# Patient Record
Sex: FEMALE | URBAN - METROPOLITAN AREA
[De-identification: names, ages, dates, MRNs, and addresses within clinical notes are randomized per-mention and may not be internally consistent; named-entity substitution may affect disease eponyms.]

---

## 2019-07-24 ENCOUNTER — TRANSCRIBE ORDERS (OUTPATIENT)
Dept: ADMINISTRATIVE | Facility: HOSPITAL | Age: 59
End: 2019-07-24

## 2019-07-24 DIAGNOSIS — Z12.31 ENCOUNTER FOR SCREENING MAMMOGRAM FOR MALIGNANT NEOPLASM OF BREAST: Primary | ICD-10-CM

## 2023-09-08 ENCOUNTER — APPOINTMENT (EMERGENCY)
Dept: CT IMAGING | Facility: HOSPITAL | Age: 63
DRG: 308 | End: 2023-09-08
Payer: COMMERCIAL

## 2023-09-08 ENCOUNTER — HOSPITAL ENCOUNTER (INPATIENT)
Facility: HOSPITAL | Age: 63
LOS: 2 days | Discharge: HOME | DRG: 308 | End: 2023-09-10
Attending: EMERGENCY MEDICINE | Admitting: STUDENT IN AN ORGANIZED HEALTH CARE EDUCATION/TRAINING PROGRAM
Payer: COMMERCIAL

## 2023-09-08 ENCOUNTER — APPOINTMENT (EMERGENCY)
Dept: RADIOLOGY | Facility: HOSPITAL | Age: 63
DRG: 308 | End: 2023-09-08
Payer: COMMERCIAL

## 2023-09-08 DIAGNOSIS — R79.89 ELEVATED D-DIMER: ICD-10-CM

## 2023-09-08 DIAGNOSIS — E66.9 OBESITY: ICD-10-CM

## 2023-09-08 DIAGNOSIS — R00.0 TACHYCARDIA: ICD-10-CM

## 2023-09-08 DIAGNOSIS — I10 HTN (HYPERTENSION): ICD-10-CM

## 2023-09-08 DIAGNOSIS — J81.1 PULMONARY EDEMA: ICD-10-CM

## 2023-09-08 DIAGNOSIS — R06.02 SHORTNESS OF BREATH: ICD-10-CM

## 2023-09-08 DIAGNOSIS — I48.92 NEW ONSET ATRIAL FLUTTER (HCC): Primary | ICD-10-CM

## 2023-09-08 DIAGNOSIS — I48.92 ATRIAL FLUTTER WITH RAPID VENTRICULAR RESPONSE (HCC): ICD-10-CM

## 2023-09-08 DIAGNOSIS — R91.1 INCIDENTAL LUNG NODULE, GREATER THAN OR EQUAL TO 8MM: ICD-10-CM

## 2023-09-08 DIAGNOSIS — J90 PLEURAL EFFUSION: ICD-10-CM

## 2023-09-08 PROBLEM — N17.9 AKI (ACUTE KIDNEY INJURY) (HCC): Status: ACTIVE | Noted: 2023-09-08

## 2023-09-08 PROBLEM — R91.8 PULMONARY NODULES: Status: ACTIVE | Noted: 2023-09-08

## 2023-09-08 PROBLEM — E87.70 FLUID OVERLOAD: Status: ACTIVE | Noted: 2023-09-08

## 2023-09-08 LAB
2HR DELTA HS TROPONIN: 0 NG/L
4HR DELTA HS TROPONIN: -4 NG/L
ALBUMIN SERPL BCP-MCNC: 3.5 G/DL (ref 3.5–5)
ALP SERPL-CCNC: 103 U/L (ref 34–104)
ALT SERPL W P-5'-P-CCNC: 50 U/L (ref 7–52)
ANION GAP SERPL CALCULATED.3IONS-SCNC: 5 MMOL/L
AST SERPL W P-5'-P-CCNC: 37 U/L (ref 13–39)
ATRIAL RATE: 162 BPM
ATRIAL RATE: 315 BPM
ATRIAL RATE: 318 BPM
BASOPHILS # BLD MANUAL: 0 THOUSAND/UL (ref 0–0.1)
BASOPHILS NFR MAR MANUAL: 0 % (ref 0–1)
BILIRUB SERPL-MCNC: 0.86 MG/DL (ref 0.2–1)
BNP SERPL-MCNC: 465 PG/ML (ref 0–100)
BUN SERPL-MCNC: 15 MG/DL (ref 5–25)
CA-I BLD-SCNC: 1.11 MMOL/L (ref 1.12–1.32)
CALCIUM SERPL-MCNC: 8.8 MG/DL (ref 8.4–10.2)
CARDIAC TROPONIN I PNL SERPL HS: 16 NG/L
CARDIAC TROPONIN I PNL SERPL HS: 20 NG/L
CARDIAC TROPONIN I PNL SERPL HS: 20 NG/L
CHLORIDE SERPL-SCNC: 108 MMOL/L (ref 96–108)
CO2 SERPL-SCNC: 27 MMOL/L (ref 21–32)
CREAT SERPL-MCNC: 1.5 MG/DL (ref 0.6–1.3)
D DIMER PPP FEU-MCNC: 2.66 UG/ML FEU
EOSINOPHIL # BLD MANUAL: 0.13 THOUSAND/UL (ref 0–0.4)
EOSINOPHIL NFR BLD MANUAL: 2 % (ref 0–6)
ERYTHROCYTE [DISTWIDTH] IN BLOOD BY AUTOMATED COUNT: 13.3 % (ref 11.6–15.1)
FLUAV RNA RESP QL NAA+PROBE: NEGATIVE
FLUBV RNA RESP QL NAA+PROBE: NEGATIVE
GFR SERPL CREATININE-BSD FRML MDRD: 37 ML/MIN/1.73SQ M
GLUCOSE SERPL-MCNC: 96 MG/DL (ref 65–140)
HCT VFR BLD AUTO: 39.4 % (ref 34.8–46.1)
HGB BLD-MCNC: 12 G/DL (ref 11.5–15.4)
LYMPHOCYTES # BLD AUTO: 2.62 THOUSAND/UL (ref 0.6–4.47)
LYMPHOCYTES # BLD AUTO: 36 % (ref 14–44)
MAGNESIUM SERPL-MCNC: 2 MG/DL (ref 1.9–2.7)
MCH RBC QN AUTO: 28.5 PG (ref 26.8–34.3)
MCHC RBC AUTO-ENTMCNC: 30.5 G/DL (ref 31.4–37.4)
MCV RBC AUTO: 94 FL (ref 82–98)
MONOCYTES # BLD AUTO: 0.33 THOUSAND/UL (ref 0–1.22)
MONOCYTES NFR BLD: 5 % (ref 4–12)
NEUTROPHILS # BLD MANUAL: 3.47 THOUSAND/UL (ref 1.85–7.62)
NEUTS SEG NFR BLD AUTO: 53 % (ref 43–75)
P AXIS: -70 DEGREES
P AXIS: 71 DEGREES
PHOSPHATE SERPL-MCNC: 4 MG/DL (ref 2.3–4.1)
PLATELET # BLD AUTO: 213 THOUSANDS/UL (ref 149–390)
PLATELET BLD QL SMEAR: ADEQUATE
PMV BLD AUTO: 11.2 FL (ref 8.9–12.7)
POLYCHROMASIA BLD QL SMEAR: PRESENT
POTASSIUM SERPL-SCNC: 4 MMOL/L (ref 3.5–5.3)
PR INTERVAL: 104 MS
PROT SERPL-MCNC: 7 G/DL (ref 6.4–8.4)
QRS AXIS: 72 DEGREES
QRS AXIS: 78 DEGREES
QRS AXIS: 79 DEGREES
QRSD INTERVAL: 158 MS
QRSD INTERVAL: 80 MS
QRSD INTERVAL: 96 MS
QT INTERVAL: 168 MS
QT INTERVAL: 290 MS
QT INTERVAL: 350 MS
QTC INTERVAL: 222 MS
QTC INTERVAL: 464 MS
QTC INTERVAL: 476 MS
RBC # BLD AUTO: 4.21 MILLION/UL (ref 3.81–5.12)
RSV RNA RESP QL NAA+PROBE: NEGATIVE
SARS-COV-2 RNA RESP QL NAA+PROBE: NEGATIVE
SODIUM SERPL-SCNC: 140 MMOL/L (ref 135–147)
T WAVE AXIS: -52 DEGREES
T WAVE AXIS: 64 DEGREES
T WAVE AXIS: 95 DEGREES
TSH SERPL DL<=0.05 MIU/L-ACNC: 1.55 UIU/ML (ref 0.45–4.5)
VARIANT LYMPHS # BLD AUTO: 4 %
VENTRICULAR RATE: 105 BPM
VENTRICULAR RATE: 106 BPM
VENTRICULAR RATE: 162 BPM
WBC # BLD AUTO: 6.55 THOUSAND/UL (ref 4.31–10.16)

## 2023-09-08 PROCEDURE — G1004 CDSM NDSC: HCPCS

## 2023-09-08 PROCEDURE — 93010 ELECTROCARDIOGRAM REPORT: CPT | Performed by: INTERNAL MEDICINE

## 2023-09-08 PROCEDURE — 80053 COMPREHEN METABOLIC PANEL: CPT

## 2023-09-08 PROCEDURE — 99285 EMERGENCY DEPT VISIT HI MDM: CPT

## 2023-09-08 PROCEDURE — 84100 ASSAY OF PHOSPHORUS: CPT

## 2023-09-08 PROCEDURE — 74177 CT ABD & PELVIS W/CONTRAST: CPT

## 2023-09-08 PROCEDURE — 93005 ELECTROCARDIOGRAM TRACING: CPT

## 2023-09-08 PROCEDURE — 83735 ASSAY OF MAGNESIUM: CPT

## 2023-09-08 PROCEDURE — 96374 THER/PROPH/DIAG INJ IV PUSH: CPT

## 2023-09-08 PROCEDURE — 71275 CT ANGIOGRAPHY CHEST: CPT

## 2023-09-08 PROCEDURE — 85379 FIBRIN DEGRADATION QUANT: CPT

## 2023-09-08 PROCEDURE — 99223 1ST HOSP IP/OBS HIGH 75: CPT | Performed by: INTERNAL MEDICINE

## 2023-09-08 PROCEDURE — 96375 TX/PRO/DX INJ NEW DRUG ADDON: CPT

## 2023-09-08 PROCEDURE — 82330 ASSAY OF CALCIUM: CPT

## 2023-09-08 PROCEDURE — 83880 ASSAY OF NATRIURETIC PEPTIDE: CPT | Performed by: EMERGENCY MEDICINE

## 2023-09-08 PROCEDURE — 84443 ASSAY THYROID STIM HORMONE: CPT | Performed by: EMERGENCY MEDICINE

## 2023-09-08 PROCEDURE — 85027 COMPLETE CBC AUTOMATED: CPT

## 2023-09-08 PROCEDURE — 0241U HB NFCT DS VIR RESP RNA 4 TRGT: CPT

## 2023-09-08 PROCEDURE — 36415 COLL VENOUS BLD VENIPUNCTURE: CPT

## 2023-09-08 PROCEDURE — 84484 ASSAY OF TROPONIN QUANT: CPT

## 2023-09-08 PROCEDURE — 85007 BL SMEAR W/DIFF WBC COUNT: CPT

## 2023-09-08 PROCEDURE — 71045 X-RAY EXAM CHEST 1 VIEW: CPT

## 2023-09-08 PROCEDURE — 99222 1ST HOSP IP/OBS MODERATE 55: CPT | Performed by: STUDENT IN AN ORGANIZED HEALTH CARE EDUCATION/TRAINING PROGRAM

## 2023-09-08 RX ORDER — HEPARIN SODIUM 5000 [USP'U]/ML
5000 INJECTION, SOLUTION INTRAVENOUS; SUBCUTANEOUS EVERY 8 HOURS SCHEDULED
Status: DISCONTINUED | OUTPATIENT
Start: 2023-09-08 | End: 2023-09-10

## 2023-09-08 RX ORDER — LOSARTAN POTASSIUM 25 MG/1
25 TABLET ORAL DAILY
COMMUNITY
End: 2023-09-10

## 2023-09-08 RX ORDER — DILTIAZEM HYDROCHLORIDE 5 MG/ML
0.25 INJECTION INTRAVENOUS ONCE
Status: COMPLETED | OUTPATIENT
Start: 2023-09-08 | End: 2023-09-08

## 2023-09-08 RX ORDER — DILTIAZEM HYDROCHLORIDE 120 MG/1
120 CAPSULE, COATED, EXTENDED RELEASE ORAL DAILY
Status: DISCONTINUED | OUTPATIENT
Start: 2023-09-08 | End: 2023-09-10 | Stop reason: HOSPADM

## 2023-09-08 RX ORDER — DILTIAZEM HYDROCHLORIDE 5 MG/ML
15 INJECTION INTRAVENOUS ONCE
Status: DISCONTINUED | OUTPATIENT
Start: 2023-09-08 | End: 2023-09-08

## 2023-09-08 RX ORDER — DILTIAZEM HYDROCHLORIDE 5 MG/ML
10 INJECTION INTRAVENOUS ONCE
Status: COMPLETED | OUTPATIENT
Start: 2023-09-08 | End: 2023-09-08

## 2023-09-08 RX ORDER — METOPROLOL TARTRATE 5 MG/5ML
10 INJECTION INTRAVENOUS ONCE
Status: COMPLETED | OUTPATIENT
Start: 2023-09-08 | End: 2023-09-08

## 2023-09-08 RX ORDER — DILTIAZEM HYDROCHLORIDE 120 MG/1
120 CAPSULE, COATED, EXTENDED RELEASE ORAL DAILY
Status: DISCONTINUED | OUTPATIENT
Start: 2023-09-08 | End: 2023-09-08

## 2023-09-08 RX ORDER — FOLIC ACID 1 MG/1
1 TABLET ORAL DAILY
COMMUNITY

## 2023-09-08 RX ORDER — FUROSEMIDE 10 MG/ML
20 INJECTION INTRAMUSCULAR; INTRAVENOUS DAILY
Status: COMPLETED | OUTPATIENT
Start: 2023-09-08 | End: 2023-09-09

## 2023-09-08 RX ORDER — FUROSEMIDE 10 MG/ML
40 INJECTION INTRAMUSCULAR; INTRAVENOUS ONCE
Status: COMPLETED | OUTPATIENT
Start: 2023-09-08 | End: 2023-09-08

## 2023-09-08 RX ORDER — SODIUM CHLORIDE, SODIUM GLUCONATE, SODIUM ACETATE, POTASSIUM CHLORIDE, MAGNESIUM CHLORIDE, SODIUM PHOSPHATE, DIBASIC, AND POTASSIUM PHOSPHATE .53; .5; .37; .037; .03; .012; .00082 G/100ML; G/100ML; G/100ML; G/100ML; G/100ML; G/100ML; G/100ML
500 INJECTION, SOLUTION INTRAVENOUS ONCE
Status: DISCONTINUED | OUTPATIENT
Start: 2023-09-08 | End: 2023-09-08

## 2023-09-08 RX ADMIN — HEPARIN SODIUM 5000 UNITS: 5000 INJECTION INTRAVENOUS; SUBCUTANEOUS at 22:15

## 2023-09-08 RX ADMIN — IOHEXOL 100 ML: 350 INJECTION, SOLUTION INTRAVENOUS at 09:22

## 2023-09-08 RX ADMIN — DILTIAZEM HYDROCHLORIDE 60 MG: 30 TABLET ORAL at 10:40

## 2023-09-08 RX ADMIN — DILTIAZEM HYDROCHLORIDE 27.5 MG: 5 INJECTION INTRAVENOUS at 10:59

## 2023-09-08 RX ADMIN — FUROSEMIDE 20 MG: 10 INJECTION, SOLUTION INTRAMUSCULAR; INTRAVENOUS at 16:19

## 2023-09-08 RX ADMIN — DILTIAZEM HYDROCHLORIDE 27.5 MG: 5 INJECTION INTRAVENOUS at 09:54

## 2023-09-08 RX ADMIN — DILTIAZEM HYDROCHLORIDE 120 MG: 120 CAPSULE, COATED, EXTENDED RELEASE ORAL at 16:18

## 2023-09-08 RX ADMIN — FUROSEMIDE 40 MG: 10 INJECTION, SOLUTION INTRAMUSCULAR; INTRAVENOUS at 10:37

## 2023-09-08 RX ADMIN — ASPIRIN 81 MG: 81 TABLET, COATED ORAL at 16:18

## 2023-09-08 RX ADMIN — DILTIAZEM HYDROCHLORIDE 2.5 MG/HR: 5 INJECTION INTRAVENOUS at 11:03

## 2023-09-08 RX ADMIN — METOROPROLOL TARTRATE 10 MG: 5 INJECTION, SOLUTION INTRAVENOUS at 08:30

## 2023-09-08 RX ADMIN — HEPARIN SODIUM 5000 UNITS: 5000 INJECTION INTRAVENOUS; SUBCUTANEOUS at 16:19

## 2023-09-08 RX ADMIN — DILTIAZEM HYDROCHLORIDE 10 MG: 5 INJECTION INTRAVENOUS at 14:58

## 2023-09-08 NOTE — ASSESSMENT & PLAN NOTE
· Presented to the ED with heart palpitations that have been ongoing for the last several months. Worsened in the last week. · In the ER found to have a narrow complex tachycardia to the 160s.   Received metoprolol 5 mg IV x2 without any effect  · Repeat chest x-ray showed atrial fibrillation with variable block, heart rate was in the low to mid 100  · CTA chest showed no evidence of PE  · COVID/RVP panel negative  · Troponin 20>20 flat, no need to repeat   · She was started on diltiazem drip with bolus currently heart rates are better controlled (80s to 90s)  · She was also given a dose of oral diltiazem 60 mg  · Plan is to titrate off the drip in the next hour  · Cardiology consulted  · Discussed anticoagulation but with her low chadvasc 1 (female) will defer

## 2023-09-08 NOTE — H&P
[address]  H&P  Name: Mary Ann Muñoz 63 y.o. female I MRN: [id number]  Unit/Bed#: ED 15 I Date of Admission: 9/8/2023   Date of Service: 9/8/2023 I Hospital Day: 0      Assessment/Plan   * Atrial flutter [facility])  Assessment & Plan  · Presented to the ED with heart palpitations that have been ongoing for the last several months. Worsened in the last week. · In the ER found to have a narrow complex tachycardia to the 160s. Received metoprolol 5 mg IV x2 without any effect  · Repeat chest x-ray showed atrial fibrillation with variable block, heart rate was in the low to mid 100  · CTA chest showed no evidence of PE  · COVID/RVP panel negative  · Troponin 20>20 flat, no need to repeat   · She was started on diltiazem drip with bolus currently heart rates are better controlled (80s to 90s)  · She was also given a dose of oral diltiazem 60 mg  · Plan is to titrate off the drip in the next hour  · Cardiology consulted  · Discussed anticoagulation but with her low chadvasc 1 (female) will defer    Fluid overload  Assessment & Plan  · Chest x-ray with possible vascular congestion  · BNP elevated  · CTAP showed mild pleural effusions  · Received Lasix 40 mg IV  · No history of heart failure as per the patient she received an echocardiogram in [facility] last week, however unfortunately not available in our records. Will try to obtain from PCP.  However office is not answering if unsuccessful we will repeat TTE  · Cardiology consulted    Pulmonary nodules  Assessment & Plan  · Incidentally seen on CT, largest is 8 mm  · Discussed with patient and   · Repeat CT scan recommended in 3 to 6 months    INGRID (acute kidney injury) ([address])  Assessment & Plan  · Creatinine 1.5  · Unfortunately we do not have a baseline, patient is unaware  · She denies any history of kidney disease  · Avoid nephrotoxic agents  · We will avoid fluids for now due to concern for fluid overload  · Repeat BMP next AM VTE Pharmacologic Prophylaxis:   Moderate Risk (Score 3-4) - Pharmacological DVT Prophylaxis Ordered: enoxaparin (Lovenox). Code Status: Level 1 - Full Code Full per patient   Discussion with family: Updated  () at bedside. Anticipated Length of Stay: Patient will be admitted on an inpatient basis with an anticipated length of stay of greater than 2 midnights secondary to AF rvr. Total Time Spent on Date of Encounter in care of patient: 55 minutes This time was spent on one or more of the following: performing physical exam; counseling and coordination of care; obtaining or reviewing history; documenting in the medical record; reviewing/ordering tests, medications or procedures; communicating with other healthcare professionals and discussing with patient's family/caregivers. Chief Complaint: SOB, palpitations    History of Present Illness:  Valla Riedel is a 63 y.o. female with a no reported PMH who presents with shortness of breath and palpitations. Reported this has been an ongoing issue for the past several months. She was recently in [facility] and developed the symptoms they are however did not seek medical attention. Symptoms resolved spontaneously. 4 days ago they returned and since then she has been having progressive dyspnea and palpitations. She denied chest pain. She is usually very active. She is a retired teacher. She denied any smoking, etoh or illicit drug history. She denies any medical history and has not been on any medications other than folic acid. She was also recently on a round of antibiotics after tooth extraction. Review of Systems:  Review of Systems   Constitutional: Positive for activity change and fatigue. Negative for chills and fever. HENT: Negative for congestion, ear discharge and ear pain. Eyes: Negative for pain and discharge. Respiratory: Positive for shortness of breath. Negative for choking and wheezing.     Cardiovascular: Positive for palpitations. Negative for chest pain and leg swelling. Gastrointestinal: Negative for abdominal distention, abdominal pain and anal bleeding. Genitourinary: Negative for difficulty urinating, dysuria and flank pain. Neurological: Negative for syncope and headaches. Psychiatric/Behavioral: Negative for agitation, behavioral problems and confusion. Past Medical and Surgical History:   Past Medical History:   Diagnosis Date   • Hypertension        Past Surgical History:   Procedure Laterality Date   •  SECTION         Meds/Allergies:  Prior to Admission medications    Not on File     I have reviewed home medications with patient personally. Allergies: No Known Allergies    Social History:  Marital Status: /Civil Union   Occupation: retired    Patient Pre-hospital Living Situation: Home  Patient Pre-hospital Level of Mobility: walks  Patient Pre-hospital Diet Restrictions: none  Substance Use History:   Social History     Substance and Sexual Activity   Alcohol Use Never     Social History     Tobacco Use   Smoking Status Never   Smokeless Tobacco Never     Social History     Substance and Sexual Activity   Drug Use Never       Family History:  History reviewed. No pertinent family history.     Physical Exam:     Vitals:   Blood Pressure: 130/80 (23 1143)  Pulse: 80 (23 1143)  Temperature: 98 °F (36.7 °C) (23 0825)  Temp Source: Temporal (23 08)  Respirations: 18 (23 1143)  Weight - Scale: 112 kg (248 lb) (23 0933)  SpO2: 99 % (23 1143)    Physical Exam   General: NAD   HEENT: Normal conjunctiva, EOMI  Neck: No palpable lymphadenopathy     Heart: Regular rate, irregular rhythm  Lungs: Clear lungs, nonlabored respirations, mild bibasilar rhonchi  Abdomen: Nontender, nondistended  Extremities: No pitting edema in bilateral lower extremities noted   Neuro: Alert and oriented x3, no focal deficits  Psych: Cooperative/calm      Additional Data:     Lab Results:  Results from last 7 days   Lab Units 09/08/23  0828   WBC Thousand/uL 6.55   HEMOGLOBIN g/dL 12.0   HEMATOCRIT % 39.4   PLATELETS Thousands/uL 213   LYMPHO PCT % 36   MONO PCT % 5   EOS PCT % 2     Results from last 7 days   Lab Units 09/08/23  0828   SODIUM mmol/L 140   POTASSIUM mmol/L 4.0   CHLORIDE mmol/L 108   CO2 mmol/L 27   BUN mg/dL 15   CREATININE mg/dL 1.50*   ANION GAP mmol/L 5   CALCIUM mg/dL 8.8   ALBUMIN g/dL 3.5   TOTAL BILIRUBIN mg/dL 0.86   ALK PHOS U/L 103   ALT U/L 50   AST U/L 37   GLUCOSE RANDOM mg/dL 96                       Lines/Drains:  Invasive Devices     Peripheral Intravenous Line  Duration           Peripheral IV 09/08/23 Left Antecubital <1 day    Peripheral IV 09/08/23 Right Antecubital <1 day                    Imaging: Reviewed radiology reports from this admission including: chest CT scan  CT pe study w abdomen pelvis w contrast   Final Result by Sandeep Junior MD (09/08 4042)      1. No pulmonary embolism. 2.  Mild pulmonary edema and small bilateral pleural effusions. 3.  No acute findings in the abdomen or pelvis. 4.  Dual small groundglass nodules in the right upper lobe, larger being 8 mm. Per Fleischner Society guidelines on the management of incidentally detected pulmonary nodules, recommend follow-up chest CT in 3-6 months. The study was marked in EPIC for significant notification. Workstation performed: [id number]         XR chest 1 view portable   Final Result by Enrike Rosenthal MD (09/08 8830)      Possible pulmonary vascular congestion. Correlation with symptoms and BNP is advised. Workstation performed: [id number]             EKG and Other Studies Reviewed on Admission:   · EKG: Atrial flutter. HR 90.    ** Please Note: This note has been constructed using a voice recognition system.  **

## 2023-09-08 NOTE — ED ATTENDING ATTESTATION
9/8/2023  IGina DO, saw and evaluated the patient. I have discussed the patient with the resident/non-physician practitioner and agree with the resident's/non-physician practitioner's findings, Plan of Care, and MDM as documented in the resident's/non-physician practitioner's note, except where noted. All available labs and Radiology studies were reviewed. I was present for key portions of any procedure(s) performed by the resident/non-physician practitioner and I was immediately available to provide assistance. At this point I agree with the current assessment done in the Emergency Department. I have conducted an independent evaluation of this patient a history and physical is as follows:    Patient is a 46-year-old female with past medical history of hypertension, presents to the emergency department for dyspnea, palpitations and chest heaviness that started on Monday. Patient recently flew to and from [facility] about 1 month ago. Starting earlier this week, she started to feel short of breath, worse with exertion and laying flat. She also feels her heart racing and when she lays flat she reports chest heaviness and pressure. Denies ever having this type of symptoms before. She denies any cough, hemoptysis, fevers or chills, headache, vertigo. She does report intermittent lightheadedness that is brief. Denies syncope, URI symptoms, abdominal pain, vomiting, diarrhea, constipation, blood per rectum or melena, urinary symptoms, leg pain or swelling, extremity weakness or paresthesia or other focal neurologic deficits. She does report intermittent nausea. Denies any hormone use or any history of VTE or known cardiac disease. On exam, patient obese, laying comfortably in no apparent distress. HEENT exam unremarkable. Lungs have diminished breath sounds in bilateral bases with bibasilar rales. Decreased air movement throughout.   Heart regular rhythm but significantly tachycardic with heart rate in the 150s. Abdomen soft, nontender, nondistended. No lower extremity pitting edema or calf swelling/tenderness. No focal motor or sensory deficits. Assessment and plan: 28-year-old female presents to the ED with 5 days of dyspnea, palpitations, chest heaviness. Patient presents significantly tachycardic with heart rate in the 150s. Rhythm appears to be sinus tachycardia. Patient given Lopressor 5 mg x 2 doses with minimal change in heart rate. Differential includes but is not limited to tachydysrhythmia, SVT, A-fib, acute PE, heart failure due to either PE or tachydysrhythmia. Will work-up with cardiac labs, D-dimer, TSH. We will have low threshold to obtain CTA chest to definitively rule out PE. Will consider a dose of Cardizem for rate control given no significant relief from the Lopressor. If no change after Cardizem, will consider cardiology consultation. Chest x-ray reviewed and patient does have mild pulmonary vascular congestion so we will hold off on IV fluids at this time as there is concern for heart failure. ED Course  ED Course as of 09/09/23 1246   Fri Sep 08, 2023   1015 Patient given Cardizem and her heart rate immediately improved to 107 bpm and she symptomatically improved in regards to her dyspnea and palpitations. Within 5 to 10 minutes of the Cardizem bolus, heart rate returned to 150 bpm.  An EKG was obtained after initial rate reduction and confirms atrial flutter with RVR. Will give second dose of Cardizem and start Cardizem drip. Patient updated about CT findings and plan to admit due to new onset atrial flutter with RVR as well as new onset heart failure. 1018 Patient's O2 noted to be between 89% and 92% which is new since being in the ED so will place on 2 L nasal cannula. 1101 Patient admitted to Step-down level 2 bed due to cardizem gtt under SLIM service.           Critical Care Time  Procedures

## 2023-09-08 NOTE — ED PROVIDER NOTES
History  Chief Complaint   Patient presents with   • Palpitations     Pt c/o feeling like her heart in racing since Monday, pt states it feels worse while laying down      63year old with PMHx of HTN who presents with heart palpitations that started about 4 weeks ago while the patient was [facility] in [facility]. She states she was doing a lot of walking and physical activity while in [facility] and noticed that she would get more SOB and feel heart palpitations. She denies feeling ill otherwise while in [facility] or now. She denies N/V/D. She reports feeling "hot" intermittently throughout the last week. She states she felt chest "pressure" yesterday but has subsided since and denies current chest pain or pressure. She reports increased SOB with lying flat. She denies numbness/tingling of extremities, pain in her BLE calves, syncope, lightheadedness, headache, or changes to her vision or hearing. She states that she recently began seeing Cardiology for the first time last week due to PCP concerns for HTN. The patient states she is actively monitoring her BP at home but is not currently taking medication for hypertension. She use of alcohol, tobacco, marijuana, or illicit drug use. Upon initial presentation, patient is hypertensive with SBP in the 90-100s and appears to have a narrow complex tachycardia on the monitor with HR in the 160s, satting >94% on room air, in no acute respiratory distress. None       Past Medical History:   Diagnosis Date   • Hypertension        Past Surgical History:   Procedure Laterality Date   •  SECTION         History reviewed. No pertinent family history. I have reviewed and agree with the history as documented.     E-Cigarette/Vaping     E-Cigarette/Vaping Substances     Social History     Tobacco Use   • Smoking status: Never   • Smokeless tobacco: Never   Substance Use Topics   • Alcohol use: Never   • Drug use: Never        Review of Systems   Constitutional: Positive for fatigue. HENT: Negative. Eyes: Negative. Respiratory: Positive for shortness of breath. Negative for cough, choking, chest tightness, wheezing and stridor. Cardiovascular: Positive for palpitations (since this past Monday ). Negative for chest pain and leg swelling. Gastrointestinal: Negative. Reports infrequent epigastric "discomfort"    Endocrine: Negative. Musculoskeletal: Negative. Skin: Negative. Allergic/Immunologic: Negative. Neurological: Negative. Psychiatric/Behavioral: Negative. All other systems reviewed and are negative. Physical Exam  ED Triage Vitals   Temperature Pulse Respirations Blood Pressure SpO2   09/08/23 0825 09/08/23 0821 09/08/23 0821 09/08/23 0821 09/08/23 0821   98 °F (36.7 °C) (!) 160 19 145/94 97 %      Temp Source Heart Rate Source Patient Position - Orthostatic VS BP Location FiO2 (%)   09/08/23 0825 09/08/23 0821 09/08/23 0821 09/08/23 0821 --   Temporal Monitor Sitting Right arm       Pain Score       --                  Orthostatic Vital Signs  Vitals:    09/08/23 0930 09/08/23 1015 09/08/23 1040 09/08/23 1110   BP: 128/82 120/68 133/81 135/82   Pulse: (!) 152 83  (!) 106   Patient Position - Orthostatic VS:           Physical Exam  Vitals reviewed. Constitutional:       General: She is not in acute distress. Appearance: She is not ill-appearing. HENT:      Head: Normocephalic and atraumatic. Mouth/Throat:      Mouth: Mucous membranes are moist.   Eyes:      General: No scleral icterus. Extraocular Movements: Extraocular movements intact. Pupils: Pupils are equal, round, and reactive to light. Cardiovascular:      Rate and Rhythm: Regular rhythm. Tachycardia present. Heart sounds: No murmur heard. No friction rub. No gallop. Comments: HR 160s on telemetry. Palpable, rapid pulses: +2/+2  Pulmonary:      Effort: Pulmonary effort is normal. No respiratory distress. Breath sounds:  No wheezing, rhonchi or rales. Comments: Diminished in b/l lung bases  Abdominal:      General: Bowel sounds are normal. There is no distension. Palpations: Abdomen is soft. Tenderness: There is no abdominal tenderness. There is no rebound. Musculoskeletal:      Cervical back: Normal range of motion and neck supple. Right lower leg: No edema. Left lower leg: No edema. Skin:     General: Skin is warm and dry. Capillary Refill: Capillary refill takes less than 2 seconds. Neurological:      General: No focal deficit present. Mental Status: She is alert and oriented to person, place, and time. Psychiatric:         Mood and Affect: Mood normal.         Behavior: Behavior normal.         ED Medications  Medications   diltiazem (CARDIZEM) 125 mg in sodium chloride 0.9 % 125 mL infusion (2.5 mg/hr Intravenous New Bag 9/8/23 1103)   metoprolol (LOPRESSOR) injection 10 mg (10 mg Intravenous Given 9/8/23 0830)   iohexol (OMNIPAQUE) 350 MG/ML injection (SINGLE-DOSE) 100 mL (100 mL Intravenous Given 9/8/23 0922)   diltiazem (CARDIZEM) injection 27.5 mg (27.5 mg Intravenous Given 9/8/23 0954)   diltiazem (CARDIZEM) injection 27.5 mg (27.5 mg Intravenous Given 9/8/23 1059)   furosemide (LASIX) injection 40 mg (40 mg Intravenous Given 9/8/23 1037)   diltiazem (CARDIZEM) tablet 60 mg (60 mg Oral Given 9/8/23 1040)       Diagnostic Studies  Results Reviewed     Procedure Component Value Units Date/Time    HS Troponin I 2hr [[id number]] Collected: 09/08/23 1107    Lab Status:  In process Specimen: Blood from Arm, Right Updated: 09/08/23 1111    TSH, 3rd generation with Free T4 reflex [[id number]]  (Normal) Collected: 09/08/23 0828    Lab Status: Final result Specimen: Blood from Arm, Left Updated: 09/08/23 1057     TSH 3RD GENERATON 1.550 uIU/mL     HS Troponin I 4hr [[id number]]     Lab Status: No result Specimen: Blood     FLU/RSV/COVID - if FLU/RSV clinically relevant [[id number]]  (Normal) Collected: 09/08/23 0932    Lab Status: Final result Specimen: Nares from Nose Updated: 09/08/23 1015     SARS-CoV-2 Negative     INFLUENZA A PCR Negative     INFLUENZA B PCR Negative     RSV PCR Negative    Narrative:      FOR PEDIATRIC PATIENTS - copy/paste COVID Guidelines URL to browser: [email or web address]    SARS-CoV-2 assay is a Nucleic Acid Amplification assay intended for the  qualitative detection of nucleic acid from SARS-CoV-2 in nasopharyngeal  swabs. Results are for the presumptive identification of SARS-CoV-2 RNA. Positive results are indicative of infection with SARS-CoV-2, the virus  causing COVID-19, but do not rule out bacterial infection or co-infection  with other viruses. Laboratories within the [facility] and its  territories are required to report all positive results to the appropriate  public health authorities. Negative results do not preclude SARS-CoV-2  infection and should not be used as the sole basis for treatment or other  patient management decisions. Negative results must be combined with  clinical observations, patient history, and epidemiological information. This test has not been FDA cleared or approved. This test has been authorized by FDA under an Emergency Use Authorization  (EUA). This test is only authorized for the duration of time the  declaration that circumstances exist justifying the authorization of the  emergency use of an in vitro diagnostic tests for detection of SARS-CoV-2  virus and/or diagnosis of COVID-19 infection under section 564(b)(1) of  the Act, 21 U. S.C. 492WTK-6(P)(0), unless the authorization is terminated  or revoked sooner. The test has been validated but independent review by FDA  and CLIA is pending. Test performed using [employer]: This RT-PCR assay targets N2,  a region unique to SARS-CoV-2.  A conserved region in the E-gene was chosen  for pan-Sarbecovirus detection which includes SARS-CoV-2. According to CMS-2020-01-R, this platform meets the definition of high-throughput technology. RBC Morphology Reflex Test [[id number]] Collected: 09/08/23 0828    Lab Status: Final result Specimen: Blood from Arm, Left Updated: 09/08/23 1001    Calcium, ionized [[id number]]  (Abnormal) Collected: 09/08/23 0943    Lab Status: Final result Specimen: Blood from Arm, Left Updated: 09/08/23 0950     Calcium, Ionized 1.11 mmol/L     B-Type Natriuretic Peptide(BNP) [[id number]]  (Abnormal) Collected: 09/08/23 0828    Lab Status: Final result Specimen: Blood from Arm, Left Updated: 09/08/23 0949      pg/mL     Magnesium [[id number]]  (Normal) Collected: 09/08/23 0828    Lab Status: Final result Specimen: Blood from Arm, Left Updated: 09/08/23 0939     Magnesium 2.0 mg/dL     Phosphorus [[id number]]  (Normal) Collected: 09/08/23 0828    Lab Status: Final result Specimen: Blood from Arm, Left Updated: 09/08/23 0939     Phosphorus 4.0 mg/dL     CBC and differential [[id number]]  (Abnormal) Collected: 09/08/23 0828    Lab Status: Final result Specimen: Blood from Arm, Left Updated: 09/08/23 0929     WBC 6.55 Thousand/uL      RBC 4.21 Million/uL      Hemoglobin 12.0 g/dL      Hematocrit 39.4 %      MCV 94 fL      MCH 28.5 pg      MCHC 30.5 g/dL      RDW 13.3 %      MPV 11.2 fL      Platelets 395 Thousands/uL     Narrative: This is an appended report. These results have been appended to a previously verified report.     Manual Differential(PHLEBS Do Not Order) [[id number]]  (Abnormal) Collected: 09/08/23 0828    Lab Status: Final result Specimen: Blood from Arm, Left Updated: 09/08/23 0929     Segmented % 53 %      Lymphocytes % 36 %      Monocytes % 5 %      Eosinophils, % 2 %      Basophils % 0 %      Atypical Lymphocytes % 4 %      Absolute Neutrophils 3.47 Thousand/uL      Lymphocytes Absolute 2.62 Thousand/uL      Monocytes Absolute 0.33 Thousand/uL      Eosinophils Absolute 0.13 Thousand/uL Basophils Absolute 0.00 Thousand/uL      Total Counted --     Platelet Estimate Adequate     Polychromasia Present    D-dimer, quantitative [[id number]]  (Abnormal) Collected: 09/08/23 0830    Lab Status: Final result Specimen: Blood from Arm, Left Updated: 09/08/23 0911     D-Dimer, Quant 2.66 ug/ml FEU     Narrative: In the evaluation for possible pulmonary embolism, in the appropriate (Well's Score of 4 or less) patient, the age adjusted d-dimer cutoff for this patient can be calculated as:    Age x 0.01 (in ug/mL) for Age-adjusted D-dimer exclusion threshold for a patient over 50 years.     HS Troponin 0hr (reflex protocol) [[id number]]  (Normal) Collected: 09/08/23 0828    Lab Status: Final result Specimen: Blood from Arm, Left Updated: 09/08/23 0902     hs TnI 0hr 20 ng/L     Comprehensive metabolic panel [[id number]]  (Abnormal) Collected: 09/08/23 0828    Lab Status: Final result Specimen: Blood from Arm, Left Updated: 09/08/23 0855     Sodium 140 mmol/L      Potassium 4.0 mmol/L      Chloride 108 mmol/L      CO2 27 mmol/L      ANION GAP 5 mmol/L      BUN 15 mg/dL      Creatinine 1.50 mg/dL      Glucose 96 mg/dL      Calcium 8.8 mg/dL      AST 37 U/L      ALT 50 U/L      Alkaline Phosphatase 103 U/L      Total Protein 7.0 g/dL      Albumin 3.5 g/dL      Total Bilirubin 0.86 mg/dL      eGFR 37 ml/min/1.73sq m     Narrative:      [facility] guidelines for Chronic Kidney Disease (CKD):   •  Stage 1 with normal or high GFR (GFR > 90 mL/min/1.73 square meters)  •  Stage 2 Mild CKD (GFR = 60-89 mL/min/1.73 square meters)  •  Stage 3A Moderate CKD (GFR = 45-59 mL/min/1.73 square meters)  •  Stage 3B Moderate CKD (GFR = 30-44 mL/min/1.73 square meters)  •  Stage 4 Severe CKD (GFR = 15-29 mL/min/1.73 square meters)  •  Stage 5 End Stage CKD (GFR <15 mL/min/1.73 square meters)  Note: GFR calculation is accurate only with a steady state creatinine               CT pe study w abdomen pelvis w contrast   Final Result by Erik Wong MD (09/08 5944)      1. No pulmonary embolism. 2.  Mild pulmonary edema and small bilateral pleural effusions. 3.  No acute findings in the abdomen or pelvis. 4.  Dual small groundglass nodules in the right upper lobe, larger being 8 mm. Per Fleischner Society guidelines on the management of incidentally detected pulmonary nodules, recommend follow-up chest CT in 3-6 months. The study was marked in EPIC for significant notification. Workstation performed: [id number]         XR chest 1 view portable   Final Result by Florence Starr MD (09/08 2605)      Possible pulmonary vascular congestion. Correlation with symptoms and BNP is advised. Workstation performed: [id number]               Procedures  ECG 12 Lead Documentation Only    Date/Time: 9/8/2023 9:22 AM    Performed by: Mell Saavedra  Authorized by: LOUIS Miranda    ECG reviewed by me, the ED Provider: yes    Patient location:  ED  Previous ECG:     Previous ECG:  Unavailable  Interpretation:     Interpretation: abnormal    Rate:     ECG rate:  162  Rhythm:     Rhythm comment:  Sinus tachycardia vs. possible SVT  QRS:     QRS axis:  Indeterminate  ST segments:     ST segments:  Non-specific        ED Course       Medical Decision Making  Ddx: ACS vs pulmonary embolus vs acute HF vs new onset tachyarrhythmia vs respiratory infection    ED course:   - continuous cardiac monitoring  - insert PIV  - BP q10 minutes  - 12-lead EKG  - CXR  - CT PE AP with contrast   - CBC  - CMP, mag, phos, iCa+  - Troponin  - D-dimer  - BNP  - Pharmacological treatment of tachyrhythmia     Interpretation: EKG showed narrow complex sinus tachycardia with RBBB  without significant ST changes. Initial troponin 20. Troponin level in conjunction with EKG findings and symptoms, there is low suspicion for ACS.  Patient DBP elevated initially on arrival. Patient given two doses of 5mg IV metoprolol with minimal effect on heart rate. BP decreased to 110s/70s. Per my review of CXR, patient appears to have moderate pulmonary vascular congestion vs pulmonary edema. D-dimer elevated at 2.66 with concerns for acute PE. Per official radiology report of CT PE AP with contrast showed no PE, mild pulmonary edema and small b/l pleural effusions and no acute intraabdominal and intrapelvic findings. CBC did not show leukocytosis, anemia, or thrombocytopenia. CMP showed elevated sCr at 1.5; baseline unknown with concerns for kidney dysfunction in the setting of chronic HTN and/or cardiac dysfunction.  with concern for acute heart strain. On exam, the patient does not appear to be in acute respiratory distress, maintaining appropriate SpO2 on room air, afebrile, palpable +2 pulses, feels warm to touch throughout. RSV/COVID/Flu negative. TSH WDL. Patient given 0.25mg/kg IV diltiazem with decrease in HR from 150s to 104bpm transiently; increased to HR 150s. 2nd bolus dose of IV diltiazem, continuous infusion, and nasal cannula supplemental oxygen ordered. Incidental finding of dual small groundglass nodules in the RUL >8mm with recommendations for repeat CT in 3-6 months; patient notified. Final diagnosis and recommendations: New onset tachyarrhthmia (afib vs aflutter). Cardiology and SLIM consulted and patient will be admitted to inpatient setting as SDII status. Ongoing care and management via SLIM team.    Amount and/or Complexity of Data Reviewed  Labs: ordered. Decision-making details documented in ED Course. Radiology: ordered. Decision-making details documented in ED Course. ECG/medicine tests: ordered. Decision-making details documented in ED Course. Risk  Prescription drug management.           Disposition  Final diagnoses:   New onset atrial flutter (HCC)   Pleural effusion   HTN (hypertension)   Tachycardia   Pulmonary edema   Obesity   Shortness of breath   Elevated d-dimer   Atrial flutter with rapid ventricular response (HCC)   Incidental lung nodule, greater than or equal to 8mm     Time reflects when diagnosis was documented in both MDM as applicable and the Disposition within this note     Time User Action Codes Description Comment    9/8/2023 10:20 AM Heath Retana Add [I48.92] New onset atrial flutter ([address])     9/8/2023 10:20 AM Heath Retana Add [J90] Pleural effusion     9/8/2023 10:20 AM Heath Retana Add [I10] HTN (hypertension)     9/8/2023 10:20 AM Heath Retana Add [R00.0] Tachycardia     9/8/2023 10:20 AM Heath Retana Add [J81.1] Pulmonary edema     9/8/2023 10:20 AM Heath Retana Add [E66.9] Obesity     9/8/2023 10:20 AM Heath Retana Add [R06.02] Shortness of breath     9/8/2023 10:20 AM Heath Retana Add [R79.89] Elevated d-dimer     9/8/2023 10:40 AM Otto CARMONA Add [I48.92] Atrial flutter with rapid ventricular response ([address])     9/8/2023 11:21 AM Heath Retana Add [R91.1] Incidental lung nodule, greater than or equal to 8mm       ED Disposition     ED Disposition   Admit    Condition   Stable    Date/Time   Fri Sep 8, 2023 11:22 AM    Comment   Case was discussed with Keily Lou MD and Otto Us MD and the patient's admission status was agreed to be Admission Status: inpatient status to the service of Dr. Keily Lou . Follow-up Information    None         Patient's Medications    No medications on file     No discharge procedures on file. PDMP Review     None           ED Provider  Attending physically available and evaluated Mak Flores. I managed the patient along with the ED Attending.     Electronically Signed by Johnna Clements, [address] [place] [address] LOUIS Knott  09/08/23 4011

## 2023-09-08 NOTE — ASSESSMENT & PLAN NOTE
· Incidentally seen on CT, largest is 8 mm  · Discussed with patient and   · Repeat CT scan recommended in 3 to 6 months

## 2023-09-08 NOTE — ASSESSMENT & PLAN NOTE
· Chest x-ray with possible vascular congestion  · BNP elevated  · CTAP showed mild pleural effusions  · Received Lasix 40 mg IV  · No history of heart failure as per the patient she received an echocardiogram in [facility] last week, however unfortunately not available in our records. Will try to obtain from PCP.  However office is not answering if unsuccessful we will repeat TTE  · Cardiology consulted

## 2023-09-08 NOTE — ASSESSMENT & PLAN NOTE
· Creatinine 1.5  · Unfortunately we do not have a baseline, patient is unaware  · She denies any history of kidney disease  · Avoid nephrotoxic agents  · We will avoid fluids for now due to concern for fluid overload  · Repeat BMP next AM

## 2023-09-08 NOTE — CONSULTS
Consultation - Cardiology   Yoselyn Bravo 63 y.o. female MRN: [id number]  Unit/Bed#: ED 12 Encounter: [id number]  09/08/23  3:02 PM    Assessment/ Plan:  1. New onset atrial flutter with RVR  • EKG and telemetry now show conversion to NSR  • Start Cardizem  mg daily, discontinue Cardizem gtt  • [id number] 2 (female, HTN) - start ASA  • Recommend outpatient sleep study to assess for SCOTT    2. Acute heart failure, unspecified  Appears nearly euvolemic on exam  ; CTA chest shows mild pulmonary edema and small BL pleural effusions  Recent echo completed in [place] - patient will attempt to obtain records for review  X2 doses IV lasix 20 mg ordered  • Strict I/O's and daily weights; recommend sodium and fluid restriction    3. Hypertension  • Currently well controlled, continue monitor  • Start Cardizem  mg daily    4. Elevated creatinine  • Creatinine 1.50, baseline is unknown, avoid nephrotoxic agents as able        History of Present Illness   Physician Requesting Consult: Bassem Quezada MD    Reason for Consult / Principal Problem: New onset atrial flutter with RVR    HPI: Yoselyn Bravo is a 63y.o. year old female with history of hypertension who presents with shortness of breath and palpitations. Symptoms have been intermittently present for several months. Patient was recently on vacation in [facility] when she experienced an episode of these symptoms which spontaneously resolved. 4 days ago symptoms again recurred, however progressively worsened prompting visitation to the ED. Patient was found to be hypervolemic and in new onset atrial flutter with RVR. Cardiology consulted for further evaluation management. Patient had echocardiogram performed last week in [facility]. However the report is unfortunately not available per chart review. However, patient was told she has a leaky heart valve. She has never had a sleep study completed before, though does snore at night.   Denies known family history of cardiac disease. Denies chest pain, LE edema, weigh gain, or syncope. Inpatient consult to Cardiology  Consult performed by: Enedina Forbes PA-C  Consult ordered by: Richard Ayala DO          EKG: Sinus rhythm with premature atrial complexes with ventricular escape complexes      Review of Systems   Constitutional: Negative for chills and fever. HENT: Negative for ear pain and sore throat. Eyes: Negative for pain and visual disturbance. Respiratory: Positive for shortness of breath. Negative for cough. Cardiovascular: Positive for palpitations. Negative for chest pain. Gastrointestinal: Negative for abdominal pain and vomiting. Genitourinary: Negative for dysuria and hematuria. Musculoskeletal: Negative for arthralgias and back pain. Skin: Negative for color change and rash. Neurological: Negative for seizures and syncope. All other systems reviewed and are negative. Historical Information   Past Medical History:   Diagnosis Date   • Hypertension      Past Surgical History:   Procedure Laterality Date   •  SECTION       Social History     Substance and Sexual Activity   Alcohol Use Never     Social History     Substance and Sexual Activity   Drug Use Never     Social History     Tobacco Use   Smoking Status Never   Smokeless Tobacco Never       Family History: History reviewed. No pertinent family history. Meds/Allergies   all current active meds have been reviewed  No Known Allergies    Objective   Vitals: Blood pressure 104/75, pulse (!) 160, temperature 98 °F (36.7 °C), temperature source Temporal, resp. rate 16, weight 112 kg (248 lb), SpO2 99 %. , There is no height or weight on file to calculate BMI.,   Orthostatic Blood Pressures    Flowsheet Row Most Recent Value   Blood Pressure 104/75 filed at 2023 1500   Patient Position - Orthostatic VS Sitting filed at 2023 5548          Systolic (92RDO), ELF:154 , Min:104 , Max:145 Diastolic (47AUH), PSX:04, Min:68, Max:100      No intake or output data in the 24 hours ending 09/08/23 1502    Invasive Devices     Peripheral Intravenous Line  Duration           Peripheral IV 09/08/23 Left Antecubital <1 day    Peripheral IV 09/08/23 Right Antecubital <1 day                    Physical Exam:  GEN: Alert and oriented x 3, in no acute distress. Well appearing and well nourished. HEENT: Sclera anicteric, conjunctivae pink, mucous membranes moist. Oropharynx clear. NECK: Supple, no carotid bruits, no significant JVD. Trachea midline, no thyromegaly. HEART: Regular rhythm, normal S1 and S2, no murmurs, clicks, gallops or rubs. PMI nondisplaced, no thrills. LUNGS: Decreased breath sounds; no wheezes, rales, or rhonchi. No increased work of breathing or signs of respiratory distress. ABDOMEN: Soft, nontender, nondistended, normoactive bowel sounds. EXTREMITIES: Skin warm and well perfused, no clubbing, cyanosis, or edema. NEURO: No focal findings. Normal speech. Mood and affect normal.   SKIN: Normal without suspicious lesions on exposed skin.     Lab Results:     Cardiac Profile:   Results from last 7 days   Lab Units 09/08/23  1252 09/08/23  1107 09/08/23  0828   HS TNI 0HR ng/L  --   --  20   HS TNI 2HR ng/L  --  20  --    HSTNI D2 ng/L  --  0  --    HS TNI 4HR ng/L 16  --   --    HSTNI D4 ng/L -4  --   --        CBC with diff:   Results from last 7 days   Lab Units 09/08/23  0828   WBC Thousand/uL 6.55   HEMOGLOBIN g/dL 12.0   HEMATOCRIT % 39.4   MCV fL 94   PLATELETS Thousands/uL 213   RBC Million/uL 4.21   MCH pg 28.5   MCHC g/dL 30.5*   RDW % 13.3   MPV fL 11.2         CMP:   Results from last 7 days   Lab Units 09/08/23  0828   POTASSIUM mmol/L 4.0   CHLORIDE mmol/L 108   CO2 mmol/L 27   BUN mg/dL 15   CREATININE mg/dL 1.50*   CALCIUM mg/dL 8.8   AST U/L 37   ALT U/L 50   ALK PHOS U/L 103   EGFR ml/min/1.73sq m 37

## 2023-09-09 PROBLEM — I48.91 ATRIAL FIBRILLATION (HCC): Status: ACTIVE | Noted: 2023-09-08

## 2023-09-09 PROBLEM — N17.9 AKI (ACUTE KIDNEY INJURY) (HCC): Status: RESOLVED | Noted: 2023-09-08 | Resolved: 2023-09-09

## 2023-09-09 LAB
ALBUMIN SERPL BCP-MCNC: 3.4 G/DL (ref 3.5–5)
ALP SERPL-CCNC: 89 U/L (ref 34–104)
ALT SERPL W P-5'-P-CCNC: 38 U/L (ref 7–52)
ANION GAP SERPL CALCULATED.3IONS-SCNC: 6 MMOL/L
AST SERPL W P-5'-P-CCNC: 21 U/L (ref 13–39)
ATRIAL RATE: 321 BPM
ATRIAL RATE: 62 BPM
BASOPHILS # BLD AUTO: 0.02 THOUSANDS/ÂΜL (ref 0–0.1)
BASOPHILS NFR BLD AUTO: 0 % (ref 0–1)
BILIRUB SERPL-MCNC: 0.84 MG/DL (ref 0.2–1)
BUN SERPL-MCNC: 14 MG/DL (ref 5–25)
CALCIUM ALBUM COR SERPL-MCNC: 9 MG/DL (ref 8.3–10.1)
CALCIUM SERPL-MCNC: 8.5 MG/DL (ref 8.4–10.2)
CHLORIDE SERPL-SCNC: 106 MMOL/L (ref 96–108)
CO2 SERPL-SCNC: 28 MMOL/L (ref 21–32)
CREAT SERPL-MCNC: 1.13 MG/DL (ref 0.6–1.3)
EOSINOPHIL # BLD AUTO: 0.14 THOUSAND/ÂΜL (ref 0–0.61)
EOSINOPHIL NFR BLD AUTO: 2 % (ref 0–6)
ERYTHROCYTE [DISTWIDTH] IN BLOOD BY AUTOMATED COUNT: 13.6 % (ref 11.6–15.1)
GFR SERPL CREATININE-BSD FRML MDRD: 52 ML/MIN/1.73SQ M
GLUCOSE SERPL-MCNC: 93 MG/DL (ref 65–140)
HCT VFR BLD AUTO: 36.6 % (ref 34.8–46.1)
HGB BLD-MCNC: 11.5 G/DL (ref 11.5–15.4)
IMM GRANULOCYTES # BLD AUTO: 0.02 THOUSAND/UL (ref 0–0.2)
IMM GRANULOCYTES NFR BLD AUTO: 0 % (ref 0–2)
LYMPHOCYTES # BLD AUTO: 2.39 THOUSANDS/ÂΜL (ref 0.6–4.47)
LYMPHOCYTES NFR BLD AUTO: 39 % (ref 14–44)
MAGNESIUM SERPL-MCNC: 2 MG/DL (ref 1.9–2.7)
MCH RBC QN AUTO: 28.8 PG (ref 26.8–34.3)
MCHC RBC AUTO-ENTMCNC: 31.4 G/DL (ref 31.4–37.4)
MCV RBC AUTO: 92 FL (ref 82–98)
MONOCYTES # BLD AUTO: 0.46 THOUSAND/ÂΜL (ref 0.17–1.22)
MONOCYTES NFR BLD AUTO: 8 % (ref 4–12)
NEUTROPHILS # BLD AUTO: 3.13 THOUSANDS/ÂΜL (ref 1.85–7.62)
NEUTS SEG NFR BLD AUTO: 51 % (ref 43–75)
NRBC BLD AUTO-RTO: 0 /100 WBCS
P AXIS: 255 DEGREES
P AXIS: 37 DEGREES
PLATELET # BLD AUTO: 187 THOUSANDS/UL (ref 149–390)
PMV BLD AUTO: 11.1 FL (ref 8.9–12.7)
POTASSIUM SERPL-SCNC: 3.5 MMOL/L (ref 3.5–5.3)
PR INTERVAL: 160 MS
PROT SERPL-MCNC: 6.7 G/DL (ref 6.4–8.4)
QRS AXIS: 77 DEGREES
QRS AXIS: 80 DEGREES
QRSD INTERVAL: 80 MS
QRSD INTERVAL: 96 MS
QT INTERVAL: 352 MS
QT INTERVAL: 408 MS
QTC INTERVAL: 414 MS
QTC INTERVAL: 469 MS
RBC # BLD AUTO: 3.99 MILLION/UL (ref 3.81–5.12)
SODIUM SERPL-SCNC: 140 MMOL/L (ref 135–147)
T WAVE AXIS: 67 DEGREES
T WAVE AXIS: 69 DEGREES
VENTRICULAR RATE: 107 BPM
VENTRICULAR RATE: 62 BPM
WBC # BLD AUTO: 6.16 THOUSAND/UL (ref 4.31–10.16)

## 2023-09-09 PROCEDURE — 99233 SBSQ HOSP IP/OBS HIGH 50: CPT | Performed by: STUDENT IN AN ORGANIZED HEALTH CARE EDUCATION/TRAINING PROGRAM

## 2023-09-09 PROCEDURE — 80053 COMPREHEN METABOLIC PANEL: CPT | Performed by: STUDENT IN AN ORGANIZED HEALTH CARE EDUCATION/TRAINING PROGRAM

## 2023-09-09 PROCEDURE — 85025 COMPLETE CBC W/AUTO DIFF WBC: CPT | Performed by: STUDENT IN AN ORGANIZED HEALTH CARE EDUCATION/TRAINING PROGRAM

## 2023-09-09 PROCEDURE — 83735 ASSAY OF MAGNESIUM: CPT | Performed by: STUDENT IN AN ORGANIZED HEALTH CARE EDUCATION/TRAINING PROGRAM

## 2023-09-09 PROCEDURE — 93010 ELECTROCARDIOGRAM REPORT: CPT | Performed by: INTERNAL MEDICINE

## 2023-09-09 PROCEDURE — 99232 SBSQ HOSP IP/OBS MODERATE 35: CPT | Performed by: INTERNAL MEDICINE

## 2023-09-09 RX ADMIN — HEPARIN SODIUM 5000 UNITS: 5000 INJECTION INTRAVENOUS; SUBCUTANEOUS at 06:15

## 2023-09-09 RX ADMIN — HEPARIN SODIUM 5000 UNITS: 5000 INJECTION INTRAVENOUS; SUBCUTANEOUS at 21:42

## 2023-09-09 RX ADMIN — FUROSEMIDE 20 MG: 10 INJECTION, SOLUTION INTRAMUSCULAR; INTRAVENOUS at 08:21

## 2023-09-09 RX ADMIN — DILTIAZEM HYDROCHLORIDE 120 MG: 120 CAPSULE, COATED, EXTENDED RELEASE ORAL at 08:20

## 2023-09-09 RX ADMIN — ASPIRIN 81 MG: 81 TABLET, COATED ORAL at 08:20

## 2023-09-09 RX ADMIN — HEPARIN SODIUM 5000 UNITS: 5000 INJECTION INTRAVENOUS; SUBCUTANEOUS at 12:54

## 2023-09-09 NOTE — ASSESSMENT & PLAN NOTE
· Presented to the ED with heart palpitations that have been ongoing for the last several months. Worsened in the last week. · In the ER found to have a narrow complex tachycardia to the 160s.   Received metoprolol 5 mg IV x2 without any effect  · Started on diltiazem drip with bolus currently heart rates are better controlled  · Also given a dose of oral diltiazem 60 mg  · Now rate controlled, increased cardizem to 120 mg daily  · Monitor on telemetry, follow up ECHO, plan for outpatient sleep study

## 2023-09-09 NOTE — PLAN OF CARE
Problem: PAIN - ADULT  Goal: Verbalizes/displays adequate comfort level or baseline comfort level  Description: Interventions:  - Encourage patient to monitor pain and request assistance  - Assess pain using appropriate pain scale  - Administer analgesics based on type and severity of pain and evaluate response  - Implement non-pharmacological measures as appropriate and evaluate response  - Consider cultural and social influences on pain and pain management  - Notify physician/advanced practitioner if interventions unsuccessful or patient reports new pain  Outcome: Progressing     Problem: INFECTION - ADULT  Goal: Absence or prevention of progression during hospitalization  Description: INTERVENTIONS:  - Assess and monitor for signs and symptoms of infection  - Monitor lab/diagnostic results  - Monitor all insertion sites, i.e. indwelling lines, tubes, and drains  - Monitor endotracheal if appropriate and nasal secretions for changes in amount and color  - [place] appropriate cooling/warming therapies per order  - Administer medications as ordered  - Instruct and encourage patient and family to use good hand hygiene technique  - Identify and instruct in appropriate isolation precautions for identified infection/condition  Outcome: Progressing  Goal: Absence of fever/infection during neutropenic period  Description: INTERVENTIONS:  - Monitor WBC    Outcome: Progressing     Problem: SAFETY ADULT  Goal: Patient will remain free of falls  Description: INTERVENTIONS:  - Educate patient/family on patient safety including physical limitations  - Instruct patient to call for assistance with activity   - Consult OT/PT to assist with strengthening/mobility   - Keep Call bell within reach  - Keep bed low and locked with side rails adjusted as appropriate  - Keep care items and personal belongings within reach  - Initiate and maintain comfort rounds  - Make Fall Risk Sign visible to staff  - Offer Toileting every 2 Hours, in advance of need  - Initiate/Maintain bed alarm  - Obtain necessary fall risk management equipment: chair alarm  - Apply yellow socks and bracelet for high fall risk patients  - Consider moving patient to room near nurses station  Outcome: Progressing  Goal: Maintain or return to baseline ADL function  Description: INTERVENTIONS:  -  Assess patient's ability to carry out ADLs; assess patient's baseline for ADL function and identify physical deficits which impact ability to perform ADLs (bathing, care of mouth/teeth, toileting, grooming, dressing, etc.)  - Assess/evaluate cause of self-care deficits   - Assess range of motion  - Assess patient's mobility; develop plan if impaired  - Assess patient's need for assistive devices and provide as appropriate  - Encourage maximum independence but intervene and supervise when necessary  - Involve family in performance of ADLs  - Assess for home care needs following discharge   - Consider OT consult to assist with ADL evaluation and planning for discharge  - Provide patient education as appropriate  Outcome: Progressing  Goal: Maintains/Returns to pre admission functional level  Description: INTERVENTIONS:  - Perform BMAT or MOVE assessment daily.   - Set and communicate daily mobility goal to care team and patient/family/caregiver. - Collaborate with rehabilitation services on mobility goals if consulted  - Perform Range of Motion 3 times a day. - Reposition patient every 3 hours.   - Dangle patient 3 times a day  - Stand patient 3 times a day  - Ambulate patient 3 times a day  - Out of bed to chair 3 times a day   - Out of bed for meals 3 times a day  - Out of bed for toileting  - Record patient progress and toleration of activity level   Outcome: Progressing     Problem: DISCHARGE PLANNING  Goal: Discharge to home or other facility with appropriate resources  Description: INTERVENTIONS:  - Identify barriers to discharge w/patient and caregiver  - Arrange for needed discharge resources and transportation as appropriate  - Identify discharge learning needs (meds, wound care, etc.)  - Arrange for interpretive services to assist at discharge as needed  - Refer to Case Management Department for coordinating discharge planning if the patient needs post-hospital services based on physician/advanced practitioner order or complex needs related to functional status, cognitive ability, or social support system  Outcome: Progressing     Problem: Knowledge Deficit  Goal: Patient/family/caregiver demonstrates understanding of disease process, treatment plan, medications, and discharge instructions  Description: Complete learning assessment and assess knowledge base.   Interventions:  - Provide teaching at level of understanding  - Provide teaching via preferred learning methods  Outcome: Progressing     Problem: CARDIOVASCULAR - ADULT  Goal: Maintains optimal cardiac output and hemodynamic stability  Description: INTERVENTIONS:  - Monitor I/O, vital signs and rhythm  - Monitor for S/S and trends of decreased cardiac output  - Administer and titrate ordered vasoactive medications to optimize hemodynamic stability  - Assess quality of pulses, skin color and temperature  - Assess for signs of decreased coronary artery perfusion  - Instruct patient to report change in severity of symptoms  Outcome: Progressing  Goal: Absence of cardiac dysrhythmias or at baseline rhythm  Description: INTERVENTIONS:  - Continuous cardiac monitoring, vital signs, obtain 12 lead EKG if ordered  - Administer antiarrhythmic and heart rate control medications as ordered  - Monitor electrolytes and administer replacement therapy as ordered  Outcome: Progressing Acute deep vein thrombosis (DVT) of left lower extremity, unspecified vein

## 2023-09-09 NOTE — ASSESSMENT & PLAN NOTE
· Creatinine 1.5  · Unfortunately we do not have a baseline, patient is unaware  · She denies any history of kidney disease  · Resolved

## 2023-09-09 NOTE — PLAN OF CARE
Problem: PAIN - ADULT  Goal: Verbalizes/displays adequate comfort level or baseline comfort level  Description: Interventions:  - Encourage patient to monitor pain and request assistance  - Assess pain using appropriate pain scale  - Administer analgesics based on type and severity of pain and evaluate response  - Implement non-pharmacological measures as appropriate and evaluate response  - Consider cultural and social influences on pain and pain management  - Notify physician/advanced practitioner if interventions unsuccessful or patient reports new pain  9/9/2023 1144 by Sharee Graham RN  Outcome: Progressing  9/9/2023 0930 by Sharee Graham RN  Outcome: Progressing     Problem: INFECTION - ADULT  Goal: Absence or prevention of progression during hospitalization  Description: INTERVENTIONS:  - Assess and monitor for signs and symptoms of infection  - Monitor lab/diagnostic results  - Monitor all insertion sites, i.e. indwelling lines, tubes, and drains  - Monitor endotracheal if appropriate and nasal secretions for changes in amount and color  - [place] appropriate cooling/warming therapies per order  - Administer medications as ordered  - Instruct and encourage patient and family to use good hand hygiene technique  - Identify and instruct in appropriate isolation precautions for identified infection/condition  9/9/2023 1144 by Sharee Graham RN  Outcome: Progressing  9/9/2023 0930 by Sharee Graham RN  Outcome: Progressing  Goal: Absence of fever/infection during neutropenic period  Description: INTERVENTIONS:  - Monitor WBC    9/9/2023 1144 by Sharee Graham RN  Outcome: Progressing  9/9/2023 0930 by Sharee Graham RN  Outcome: Progressing     Problem: SAFETY ADULT  Goal: Patient will remain free of falls  Description: INTERVENTIONS:  - Educate patient/family on patient safety including physical limitations  - Instruct patient to call for assistance with activity   - Consult OT/PT to assist with strengthening/mobility   - Keep Call bell within reach  - Keep bed low and locked with side rails adjusted as appropriate  - Keep care items and personal belongings within reach  - Initiate and maintain comfort rounds  - Make Fall Risk Sign visible to staff  - Offer Toileting every 2 Hours, in advance of need  - Initiate/Maintain bed alarm  - Obtain necessary fall risk management equipment: chair alarm  - Apply yellow socks and bracelet for high fall risk patients  - Consider moving patient to room near nurses station  9/9/2023 1144 by Michelle Mullins RN  Outcome: Progressing  9/9/2023 0930 by Michelle Mullins RN  Outcome: Progressing  Goal: Maintain or return to baseline ADL function  Description: INTERVENTIONS:  -  Assess patient's ability to carry out ADLs; assess patient's baseline for ADL function and identify physical deficits which impact ability to perform ADLs (bathing, care of mouth/teeth, toileting, grooming, dressing, etc.)  - Assess/evaluate cause of self-care deficits   - Assess range of motion  - Assess patient's mobility; develop plan if impaired  - Assess patient's need for assistive devices and provide as appropriate  - Encourage maximum independence but intervene and supervise when necessary  - Involve family in performance of ADLs  - Assess for home care needs following discharge   - Consider OT consult to assist with ADL evaluation and planning for discharge  - Provide patient education as appropriate  9/9/2023 1144 by Michelle Mullins RN  Outcome: Progressing  9/9/2023 0930 by Michelle Mullins RN  Outcome: Progressing  Goal: Maintains/Returns to pre admission functional level  Description: INTERVENTIONS:  - Perform BMAT or MOVE assessment daily.   - Set and communicate daily mobility goal to care team and patient/family/caregiver. - Collaborate with rehabilitation services on mobility goals if consulted  - Perform Range of Motion 3 times a day. - Reposition patient every 3 hours.   - Dangle patient 3 times a day  - Stand patient 3 times a day  - Ambulate patient 3 times a day  - Out of bed to chair 3 times a day   - Out of bed for meals 3 times a day  - Out of bed for toileting  - Record patient progress and toleration of activity level   9/9/2023 1144 by Kanchan Viveros RN  Outcome: Progressing  9/9/2023 0930 by Kanchan Viveros RN  Outcome: Progressing     Problem: DISCHARGE PLANNING  Goal: Discharge to home or other facility with appropriate resources  Description: INTERVENTIONS:  - Identify barriers to discharge w/patient and caregiver  - Arrange for needed discharge resources and transportation as appropriate  - Identify discharge learning needs (meds, wound care, etc.)  - Arrange for interpretive services to assist at discharge as needed  - Refer to Case Management Department for coordinating discharge planning if the patient needs post-hospital services based on physician/advanced practitioner order or complex needs related to functional status, cognitive ability, or social support system  9/9/2023 1144 by Kanchan Viveros RN  Outcome: Progressing  9/9/2023 0930 by Kanchan Viveros RN  Outcome: Progressing     Problem: Knowledge Deficit  Goal: Patient/family/caregiver demonstrates understanding of disease process, treatment plan, medications, and discharge instructions  Description: Complete learning assessment and assess knowledge base.   Interventions:  - Provide teaching at level of understanding  - Provide teaching via preferred learning methods  9/9/2023 1144 by Kanchan Viveros RN  Outcome: Progressing  9/9/2023 0930 by Kanchan Viveros RN  Outcome: Progressing     Problem: CARDIOVASCULAR - ADULT  Goal: Maintains optimal cardiac output and hemodynamic stability  Description: INTERVENTIONS:  - Monitor I/O, vital signs and rhythm  - Monitor for S/S and trends of decreased cardiac output  - Administer and titrate ordered vasoactive medications to optimize hemodynamic stability  - Assess quality of pulses, skin color and temperature  - Assess for signs of decreased coronary artery perfusion  - Instruct patient to report change in severity of symptoms  9/9/2023 1144 by Alvin Jeffery RN  Outcome: Progressing  9/9/2023 0930 by Alvin Jeffery RN  Outcome: Progressing  Goal: Absence of cardiac dysrhythmias or at baseline rhythm  Description: INTERVENTIONS:  - Continuous cardiac monitoring, vital signs, obtain 12 lead EKG if ordered  - Administer antiarrhythmic and heart rate control medications as ordered  - Monitor electrolytes and administer replacement therapy as ordered  9/9/2023 1144 by Alvin Jeffery RN  Outcome: Progressing  9/9/2023 0930 by Alvin Jeffery RN  Outcome: Progressing

## 2023-09-09 NOTE — PROGRESS NOTES
Cardiology Progress Note - Manisha Retort 63 y.o. female MRN: [id number]    Unit/Bed#: -01 Encounter: [id number]      Assessment/Plan:  1. New onset atrial flutter with RVR  • Telemetry shows maintenance of NSR, continue to monitor  • TSH wnl  • Continue Cardizem  mg daily  • [id number] 2 (female, HTN) - start ASA  • Recommend outpatient sleep study to assess for SCOTT     2. Acute heart failure, unspecified  • Euvolemic on exam  • Echo ordered - outpatient cardiology records did not mention LVEF  • No further diuretic requirement at this time  • Strict I/O's and daily weights; recommend sodium restriction     3. Hypertension  • Currently well controlled, continue to monitor  • Continue Cardizem CD     4. INGRID  • Resolved, continue to monitor, avoid nephrotoxic agents as able      Subjective:   Patient seen and examined. No significant events overnight. Patient reports she is feeling much better today. Denies any new complaints at this time. All questions were answered. States she would like to transition cardiology follow-up to [facility] [place], [place]. Objective:     Vitals: Blood pressure 132/85, pulse 70, temperature 98.1 °F (36.7 °C), resp. rate 17, height 5' 8" (1.727 m), weight 115 kg (254 lb 6.6 oz), SpO2 97 %. , Body mass index is 38.68 kg/m².,   Orthostatic Blood Pressures    Flowsheet Row Most Recent Value   Blood Pressure 132/85 filed at 09/09/2023 0803   Patient Position - Orthostatic VS Sitting filed at 09/08/2023 0821          No intake or output data in the 24 hours ending 09/09/23 0944      Physical Exam:  GEN: Alert and oriented x 3, in no acute distress. Well appearing and well nourished. HEENT: Sclera anicteric, conjunctivae pink, mucous membranes moist. Oropharynx clear. NECK: Supple, no carotid bruits, no significant JVD. Trachea midline, no thyromegaly. HEART: Regular rhythm, normal S1 and S2, no murmurs, clicks, gallops or rubs. PMI nondisplaced, no thrills.    LUNGS: Clear to auscultation bilaterally; no wheezes, rales, or rhonchi. No increased work of breathing or signs of respiratory distress. ABDOMEN: Soft, nontender, nondistended, normoactive bowel sounds. EXTREMITIES: Skin warm and well perfused, no clubbing, cyanosis, or edema. NEURO: No focal findings. Normal speech. Mood and affect normal.   SKIN: Normal without suspicious lesions on exposed skin. Medications:      Current Facility-Administered Medications:   •  aspirin (ECOTRIN LOW STRENGTH) EC tablet 81 mg, 81 mg, Oral, Daily, Teri Wimbledon Meno, PA-C, 81 mg at 09/09/23 0820  •  diltiazem (CARDIZEM CD) 24 hr capsule 120 mg, 120 mg, Oral, Daily, Teri Wimbledon Meno, PA-C, 120 mg at 09/09/23 0820  •  heparin (porcine) subcutaneous injection 5,000 Units, 5,000 Units, Subcutaneous, Q8H [address], Kendra Mejia MD, 5,000 Units at 09/09/23 0615     Labs & Results:     Results from last 7 days   Lab Units 09/08/23  1252 09/08/23  1107 09/08/23  0828   HS TNI 0HR ng/L  --   --  20   HS TNI 2HR ng/L  --  20  --    HSTNI D2 ng/L  --  0  --    HS TNI 4HR ng/L 16  --   --    HSTNI D4 ng/L -4  --   --      Results from last 7 days   Lab Units 09/09/23  0507 09/08/23  0828   WBC Thousand/uL 6.16 6.55   HEMOGLOBIN g/dL 11.5 12.0   HEMATOCRIT % 36.6 39.4   PLATELETS Thousands/uL 187 213         Results from last 7 days   Lab Units 09/09/23  0507 09/08/23  0828   POTASSIUM mmol/L 3.5 4.0   CHLORIDE mmol/L 106 108   CO2 mmol/L 28 27   BUN mg/dL 14 15   CREATININE mg/dL 1.13 1.50*   CALCIUM mg/dL 8.5 8.8   ALK PHOS U/L 89 103   ALT U/L 38 50   AST U/L 21 37         Results from last 7 days   Lab Units 09/09/23  0507 09/08/23  0828   MAGNESIUM mg/dL 2.0 2.0       Vitals: Blood pressure 132/85, pulse 70, temperature 98.1 °F (36.7 °C), resp. rate 17, height 5' 8" (1.727 m), weight 115 kg (254 lb 6.6 oz), SpO2 97 %. , Body mass index is 38.68 kg/m².,   Orthostatic Blood Pressures    Flowsheet Row Most Recent Value   Blood Pressure 132/85 filed at 09/09/2023 0803   Patient Position - Orthostatic VS Sitting filed at 09/08/2023 6422          Systolic (95UWO), HXO:626 , Min:96 , TTT:394     Diastolic (90WSZ), NHL:20, Min:61, Max:86      No intake or output data in the 24 hours ending 09/09/23 0944    Invasive Devices     Peripheral Intravenous Line  Duration           Peripheral IV 09/08/23 Left Antecubital 1 day    Peripheral IV 09/08/23 Right Antecubital <1 day                  EKG:  Sinus rhythm with premature atrial complexes with ventricular escape complexes    Telemetry:  Telemetry Orders (From admission, onward)             24 Hour Telemetry Monitoring  Continuous x 24 Hours (Telem)        Question:  Reason for 24 Hour Telemetry  Answer:  Arrhythmias requiring acute medical intervention / PPM or ICD malfunction                 Telemetry Reviewed: Normal Sinus Rhythm    BP Readings from Last 3 Encounters:   09/09/23 132/85      Wt Readings from Last 3 Encounters:   09/09/23 115 kg (254 lb 6.6 oz)

## 2023-09-09 NOTE — PROGRESS NOTES
[address]  Progress Note  Name: Thierno Hernandez  MRN: [id number]  Unit/Bed#: -01 I Date of Admission: 9/8/2023   Date of Service: 9/9/2023 I Hospital Day: 1    Assessment/Plan   * Atrial fibrillation [facility])  Assessment & Plan  · Presented to the ED with heart palpitations that have been ongoing for the last several months. Worsened in the last week. · In the ER found to have a narrow complex tachycardia to the 160s. Received metoprolol 5 mg IV x2 without any effect  · Started on diltiazem drip with bolus currently heart rates are better controlled  · Also given a dose of oral diltiazem 60 mg  · Now rate controlled, increased cardizem to 120 mg daily  · Monitor on telemetry, follow up ECHO, plan for outpatient sleep study     Pulmonary nodules  Assessment & Plan  · Incidentally seen on CT, largest is 8 mm  · Discussed with patient and   · Repeat CT scan recommended in 3 to 6 months    INGRID (acute kidney injury) (HCC)-resolved as of 9/9/2023  Assessment & Plan  · Creatinine 1.5  · Unfortunately we do not have a baseline, patient is unaware  · She denies any history of kidney disease  · Resolved               VTE Pharmacologic Prophylaxis:   Moderate Risk (Score 3-4) - Pharmacological DVT Prophylaxis Ordered: heparin. Patient Centered Rounds: I performed bedside rounds with nursing staff today. Discussions with Specialists or Other Care Team Provider: cardiology     Education and Discussions with Family / Patient: Updated  (daughter) at bedside. Total Time Spent on Date of Encounter in care of patient: 35 minutes This time was spent on one or more of the following: performing physical exam; counseling and coordination of care; obtaining or reviewing history; documenting in the medical record; reviewing/ordering tests, medications or procedures; communicating with other healthcare professionals and discussing with patient's family/caregivers.     Current Length of Stay: 1 day(s)  Current Patient Status: Inpatient   Certification Statement: The patient will continue to require additional inpatient hospital stay due to TTE  Discharge Plan: Anticipate discharge tomorrow to home. Code Status: Level 1 - Full Code    Subjective:   Patient feels well today     Objective:     Vitals:   Temp (24hrs), Av.1 °F (36.7 °C), Min:98 °F (36.7 °C), Max:98.2 °F (36.8 °C)    Temp:  [98 °F (36.7 °C)-98.2 °F (36.8 °C)] 98.1 °F (36.7 °C)  HR:  [] 70  Resp:  [14-18] 17  BP: ()/(61-86) 132/85  SpO2:  [96 %-100 %] 97 %  Body mass index is 38.68 kg/m². Input and Output Summary (last 24 hours): Intake/Output Summary (Last 24 hours) at 2023 1204  Last data filed at 2023 1101  Gross per 24 hour   Intake 120 ml   Output 1200 ml   Net -1080 ml       Physical Exam:   Physical Exam  Constitutional:       General: She is not in acute distress. Appearance: Normal appearance. She is not toxic-appearing. Cardiovascular:      Rate and Rhythm: Normal rate and regular rhythm. Heart sounds: Normal heart sounds. No murmur heard. Pulmonary:      Effort: Pulmonary effort is normal. No respiratory distress. Breath sounds: Normal breath sounds. No wheezing. Abdominal:      General: Abdomen is flat. There is no distension. Palpations: Abdomen is soft. Tenderness: There is no abdominal tenderness. Neurological:      General: No focal deficit present. Mental Status: She is alert and oriented to person, place, and time. Mental status is at baseline. Motor: No weakness.           Additional Data:     Labs:  Results from last 7 days   Lab Units 23  0507   WBC Thousand/uL 6.16   HEMOGLOBIN g/dL 11.5   HEMATOCRIT % 36.6   PLATELETS Thousands/uL 187   NEUTROS PCT % 51   LYMPHS PCT % 39   MONOS PCT % 8   EOS PCT % 2     Results from last 7 days   Lab Units 23  0507   SODIUM mmol/L 140   POTASSIUM mmol/L 3.5   CHLORIDE mmol/L 106   CO2 mmol/L 28   BUN mg/dL 14   CREATININE mg/dL 1.13   ANION GAP mmol/L 6   CALCIUM mg/dL 8.5   ALBUMIN g/dL 3.4*   TOTAL BILIRUBIN mg/dL 0.84   ALK PHOS U/L 89   ALT U/L 38   AST U/L 21   GLUCOSE RANDOM mg/dL 93                       Lines/Drains:  Invasive Devices     Peripheral Intravenous Line  Duration           Peripheral IV 09/08/23 Left Antecubital 1 day    Peripheral IV 09/08/23 Right Antecubital 1 day                  Telemetry:  Telemetry Orders (From admission, onward)             24 Hour Telemetry Monitoring  Continuous x 24 Hours (Telem)        Question:  Reason for 24 Hour Telemetry  Answer:  Arrhythmias requiring acute medical intervention / PPM or ICD malfunction                 Telemetry Reviewed: Normal Sinus Rhythm  Indication for Continued Telemetry Use: Arrthymias requiring medical therapy             Imaging: No pertinent imaging reviewed. Recent Cultures (last 7 days):         Last 24 Hours Medication List:   Current Facility-Administered Medications   Medication Dose Route Frequency Provider Last Rate   • aspirin  81 mg Oral Daily Trini Daily, CHAIM     • diltiazem  120 mg Oral Daily Trini Daily, CHAIM     • heparin (porcine)  5,000 Units Subcutaneous Q8H [facility] Desiree Abreu MD          Today, Patient Was Seen By: Trae Mendes MD    **Please Note: This note may have been constructed using a voice recognition system. **

## 2023-09-10 ENCOUNTER — APPOINTMENT (INPATIENT)
Dept: NON INVASIVE DIAGNOSTICS | Facility: HOSPITAL | Age: 63
DRG: 308 | End: 2023-09-10
Payer: COMMERCIAL

## 2023-09-10 VITALS
HEART RATE: 74 BPM | SYSTOLIC BLOOD PRESSURE: 122 MMHG | OXYGEN SATURATION: 100 % | HEIGHT: 68 IN | BODY MASS INDEX: 38.49 KG/M2 | RESPIRATION RATE: 18 BRPM | WEIGHT: 254 LBS | TEMPERATURE: 97.7 F | DIASTOLIC BLOOD PRESSURE: 79 MMHG

## 2023-09-10 LAB
AORTIC ROOT: 2.6 CM
APICAL FOUR CHAMBER EJECTION FRACTION: 65 %
ASCENDING AORTA: 3.2 CM
DOP CALC MV VTI: 50.02 CM
E WAVE DECELERATION TIME: 427 MS
FRACTIONAL SHORTENING: 39 % (ref 28–44)
INR PPP: 0.96 (ref 0.84–1.19)
INTERVENTRICULAR SEPTUM IN DIASTOLE (PARASTERNAL SHORT AXIS VIEW): 1.1 CM
INTERVENTRICULAR SEPTUM: 1.1 CM (ref 0.6–1.1)
LAAS-AP2: 30.5 CM2
LAAS-AP4: 31.9 CM2
LEFT ATRIUM SIZE: 5.7 CM
LEFT ATRIUM VOLUME (MOD BIPLANE): 112 ML
LEFT INTERNAL DIMENSION IN SYSTOLE: 2.7 CM (ref 2.1–4)
LEFT VENTRICULAR INTERNAL DIMENSION IN DIASTOLE: 4.4 CM (ref 3.5–6)
LEFT VENTRICULAR POSTERIOR WALL IN END DIASTOLE: 1.1 CM
LEFT VENTRICULAR STROKE VOLUME: 59 ML
LVSV (TEICH): 59 ML
MV E'TISSUE VEL-SEP: 6 CM/S
MV MEAN GRADIENT: 6 MMHG
MV PEAK A VEL: 1.03 M/S
MV PEAK E VEL: 167 CM/S
MV PEAK GRADIENT: 13 MMHG
MV STENOSIS PRESSURE HALF TIME: 124 MS
MV VALVE AREA P 1/2 METHOD: 1.77 CM2
PA SYSTOLIC PRESSURE: 40 MMHG
PROTHROMBIN TIME: 13.3 SECONDS (ref 11.6–14.5)
RIGHT ATRIUM AREA SYSTOLE A4C: 21.1 CM2
RIGHT VENTRICLE ID DIMENSION: 3.9 CM
SL CV LEFT ATRIUM LENGTH A2C: 6.9 CM
SL CV LV EF: 60
SL CV PED ECHO LEFT VENTRICLE DIASTOLIC VOLUME (MOD BIPLANE) 2D: 87 ML
SL CV PED ECHO LEFT VENTRICLE SYSTOLIC VOLUME (MOD BIPLANE) 2D: 28 ML
TR MAX PG: 37 MMHG
TR PEAK VELOCITY: 3 M/S
TRICUSPID ANNULAR PLANE SYSTOLIC EXCURSION: 2.1 CM
TRICUSPID VALVE PEAK REGURGITATION VELOCITY: 3.04 M/S

## 2023-09-10 PROCEDURE — 93306 TTE W/DOPPLER COMPLETE: CPT

## 2023-09-10 PROCEDURE — 93306 TTE W/DOPPLER COMPLETE: CPT | Performed by: INTERNAL MEDICINE

## 2023-09-10 PROCEDURE — 99239 HOSP IP/OBS DSCHRG MGMT >30: CPT | Performed by: STUDENT IN AN ORGANIZED HEALTH CARE EDUCATION/TRAINING PROGRAM

## 2023-09-10 PROCEDURE — 85610 PROTHROMBIN TIME: CPT

## 2023-09-10 PROCEDURE — 99232 SBSQ HOSP IP/OBS MODERATE 35: CPT | Performed by: INTERNAL MEDICINE

## 2023-09-10 RX ORDER — WARFARIN SODIUM 5 MG/1
5 TABLET ORAL
Status: DISCONTINUED | OUTPATIENT
Start: 2023-09-10 | End: 2023-09-10 | Stop reason: HOSPADM

## 2023-09-10 RX ORDER — DILTIAZEM HYDROCHLORIDE 120 MG/1
120 CAPSULE, COATED, EXTENDED RELEASE ORAL DAILY
Qty: 30 CAPSULE | Refills: 0 | Status: SHIPPED | OUTPATIENT
Start: 2023-09-11 | End: 2023-10-11

## 2023-09-10 RX ORDER — ENOXAPARIN SODIUM 100 MG/ML
1.5 INJECTION SUBCUTANEOUS ONCE
Status: COMPLETED | OUTPATIENT
Start: 2023-09-10 | End: 2023-09-10

## 2023-09-10 RX ORDER — WARFARIN SODIUM 5 MG/1
5 TABLET ORAL
Qty: 30 TABLET | Refills: 0 | Status: SHIPPED | OUTPATIENT
Start: 2023-09-10

## 2023-09-10 RX ORDER — ENOXAPARIN SODIUM 300 MG/3ML
1.5 INJECTION INTRAVENOUS; SUBCUTANEOUS ONCE
Status: DISCONTINUED | OUTPATIENT
Start: 2023-09-10 | End: 2023-09-10

## 2023-09-10 RX ADMIN — ENOXAPARIN SODIUM 170 MG: 100 INJECTION SUBCUTANEOUS at 10:55

## 2023-09-10 RX ADMIN — HEPARIN SODIUM 5000 UNITS: 5000 INJECTION INTRAVENOUS; SUBCUTANEOUS at 05:44

## 2023-09-10 RX ADMIN — DILTIAZEM HYDROCHLORIDE 120 MG: 120 CAPSULE, COATED, EXTENDED RELEASE ORAL at 09:32

## 2023-09-10 RX ADMIN — ASPIRIN 81 MG: 81 TABLET, COATED ORAL at 09:32

## 2023-09-10 NOTE — PLAN OF CARE
Problem: PAIN - ADULT  Goal: Verbalizes/displays adequate comfort level or baseline comfort level  Description: Interventions:  - Encourage patient to monitor pain and request assistance  - Assess pain using appropriate pain scale  - Administer analgesics based on type and severity of pain and evaluate response  - Implement non-pharmacological measures as appropriate and evaluate response  - Consider cultural and social influences on pain and pain management  - Notify physician/advanced practitioner if interventions unsuccessful or patient reports new pain  Outcome: Progressing     Problem: INFECTION - ADULT  Goal: Absence or prevention of progression during hospitalization  Description: INTERVENTIONS:  - Assess and monitor for signs and symptoms of infection  - Monitor lab/diagnostic results  - Monitor all insertion sites, i.e. indwelling lines, tubes, and drains  - Monitor endotracheal if appropriate and nasal secretions for changes in amount and color  - [place] appropriate cooling/warming therapies per order  - Administer medications as ordered  - Instruct and encourage patient and family to use good hand hygiene technique  - Identify and instruct in appropriate isolation precautions for identified infection/condition  Outcome: Progressing  Goal: Absence of fever/infection during neutropenic period  Description: INTERVENTIONS:  - Monitor WBC    Outcome: Progressing     Problem: SAFETY ADULT  Goal: Patient will remain free of falls  Description: INTERVENTIONS:  - Educate patient/family on patient safety including physical limitations  - Instruct patient to call for assistance with activity   - Consult OT/PT to assist with strengthening/mobility   - Keep Call bell within reach  - Keep bed low and locked with side rails adjusted as appropriate  - Keep care items and personal belongings within reach  - Initiate and maintain comfort rounds  - Make Fall Risk Sign visible to staff  - Offer Toileting every  Hours, in advance of need  - Initiate/Maintain alarm  - Obtain necessary fall risk management equipment:   - Apply yellow socks and bracelet for high fall risk patients  - Consider moving patient to room near nurses station  Outcome: Progressing  Goal: Maintain or return to baseline ADL function  Description: INTERVENTIONS:  -  Assess patient's ability to carry out ADLs; assess patient's baseline for ADL function and identify physical deficits which impact ability to perform ADLs (bathing, care of mouth/teeth, toileting, grooming, dressing, etc.)  - Assess/evaluate cause of self-care deficits   - Assess range of motion  - Assess patient's mobility; develop plan if impaired  - Assess patient's need for assistive devices and provide as appropriate  - Encourage maximum independence but intervene and supervise when necessary  - Involve family in performance of ADLs  - Assess for home care needs following discharge   - Consider OT consult to assist with ADL evaluation and planning for discharge  - Provide patient education as appropriate  Outcome: Progressing  Goal: Maintains/Returns to pre admission functional level  Description: INTERVENTIONS:  - Perform BMAT or MOVE assessment daily.   - Set and communicate daily mobility goal to care team and patient/family/caregiver. - Collaborate with rehabilitation services on mobility goals if consulted  - Perform Range of Motion  times a day. - Reposition patient every  hours.   - Dangle patient  times a day  - Stand patient  times a day  - Ambulate patient  times a day  - Out of bed to chair  times a day   - Out of bed for meal times a day  - Out of bed for toileting  - Record patient progress and toleration of activity level   Outcome: Progressing     Problem: DISCHARGE PLANNING  Goal: Discharge to home or other facility with appropriate resources  Description: INTERVENTIONS:  - Identify barriers to discharge w/patient and caregiver  - Arrange for needed discharge resources and transportation as appropriate  - Identify discharge learning needs (meds, wound care, etc.)  - Arrange for interpretive services to assist at discharge as needed  - Refer to Case Management Department for coordinating discharge planning if the patient needs post-hospital services based on physician/advanced practitioner order or complex needs related to functional status, cognitive ability, or social support system  Outcome: Progressing     Problem: Knowledge Deficit  Goal: Patient/family/caregiver demonstrates understanding of disease process, treatment plan, medications, and discharge instructions  Description: Complete learning assessment and assess knowledge base.   Interventions:  - Provide teaching at level of understanding  - Provide teaching via preferred learning methods  Outcome: Progressing     Problem: CARDIOVASCULAR - ADULT  Goal: Maintains optimal cardiac output and hemodynamic stability  Description: INTERVENTIONS:  - Monitor I/O, vital signs and rhythm  - Monitor for S/S and trends of decreased cardiac output  - Administer and titrate ordered vasoactive medications to optimize hemodynamic stability  - Assess quality of pulses, skin color and temperature  - Assess for signs of decreased coronary artery perfusion  - Instruct patient to report change in severity of symptoms  Outcome: Progressing  Goal: Absence of cardiac dysrhythmias or at baseline rhythm  Description: INTERVENTIONS:  - Continuous cardiac monitoring, vital signs, obtain 12 lead EKG if ordered  - Administer antiarrhythmic and heart rate control medications as ordered  - Monitor electrolytes and administer replacement therapy as ordered  Outcome: Progressing

## 2023-09-10 NOTE — ASSESSMENT & PLAN NOTE
· Chest x-ray with possible vascular congestion  · BNP elevated  · CTAP showed mild pleural effusions  · Likely in the setting of new onset afib  · Received Lasix 40 mg IV  · Euvolemic on exam  · ECHO showing diastolic dysfunction  · Resolved

## 2023-09-10 NOTE — PROGRESS NOTES
Cardiology Progress Note - Yoselyn Bravo 63 y.o. female MRN: [id number]    Unit/Bed#: -01 Encounter: [id number]      Assessment/Plan:  1.  New onset valvular atrial flutter with RVR  • Telemetry shows maintenance of NSR, continue to monitor  • Echo shows EF 60%, G2 DD, moderate left atrial dilation, mild right atrial dilation, moderate MAC, moderate MR, mild to moderate MS, mild TR, mild pulmonary hypertension (PASP 40.0 mmHg)  • Continue Cardizem  mg daily  • [id number] 3 (female, HTN, CHF) - start Coumadin. We will establish with our Coumadin clinic  • Recommend outpatient sleep study to assess for SCOTT     2.  Acute HFpEF  • Euvolemic on exam  • See echo results above  • Continue to monitor off diuretics  • Strict I/O's and daily weights; recommend sodium restriction     3.  Hypertension  • Well controlled, continue to monitor  • Continue Cardizem CD    4. Mild-moderate MS, Moderate MR  • Recommend periodic outpatient surveillance     5.  INGRID  • Resolved       Patient is stable for discharge from a cardiac standpoint on current medications. Patient would like to transition cardiology follow-up to [facility]. We will plan to arrange with our Coumadin clinic. Recommend outpatient sleep study to assess for SCOTT. Thank you for this consultation. Subjective:   Patient seen and examined. No significant events overnight. Patient resting in bed comfortably. Denies any new complaints at this time. All questions were answered. Objective:     Vitals: Blood pressure 128/89, pulse 61, temperature 97.7 °F (36.5 °C), resp. rate 18, height 5' 8" (1.727 m), weight 115 kg (254 lb), SpO2 95 %. , Body mass index is 38.62 kg/m².,   Orthostatic Blood Pressures    Flowsheet Row Most Recent Value   Blood Pressure 128/89 filed at 09/10/2023 1286   Patient Position - Orthostatic VS Sitting filed at 09/08/2023 6005            Intake/Output Summary (Last 24 hours) at 9/10/2023 1006  Last data filed at 9/9/2023 1300  Gross per 24 hour   Intake 460 ml   Output 1200 ml   Net -740 ml         Physical Exam:  GEN: Alert and oriented x 3, in no acute distress. Well appearing and well nourished. HEENT: Sclera anicteric, conjunctivae pink, mucous membranes moist. Oropharynx clear. NECK: Supple, no carotid bruits, no significant JVD. Trachea midline, no thyromegaly. HEART: Regular rhythm, normal S1 and S2, no murmurs, clicks, gallops or rubs. PMI nondisplaced, no thrills. LUNGS: Clear to auscultation bilaterally; no wheezes, rales, or rhonchi. No increased work of breathing or signs of respiratory distress. ABDOMEN: Soft, nontender, nondistended, normoactive bowel sounds. EXTREMITIES: Skin warm and well perfused, no clubbing, cyanosis, or edema. NEURO: No focal findings. Normal speech. Mood and affect normal.   SKIN: Normal without suspicious lesions on exposed skin.         Medications:      Current Facility-Administered Medications:   •  aspirin (ECOTRIN LOW STRENGTH) EC tablet 81 mg, 81 mg, Oral, Daily, NIEVES Stevens-WENDY, 81 mg at 09/10/23 0932  •  diltiazem (CARDIZEM CD) 24 hr capsule 120 mg, 120 mg, Oral, Daily, NIEVES Stevens-WENDY, 120 mg at 09/10/23 0932  •  heparin (porcine) subcutaneous injection 5,000 Units, 5,000 Units, Subcutaneous, Q8H [facility], Lindsay Odell MD, 5,000 Units at 09/10/23 0544     Labs & Results:     Results from last 7 days   Lab Units 09/08/23  1252 09/08/23  1107 09/08/23  0828   HS TNI 0HR ng/L  --   --  20   HS TNI 2HR ng/L  --  20  --    HSTNI D2 ng/L  --  0  --    HS TNI 4HR ng/L 16  --   --    HSTNI D4 ng/L -4  --   --      Results from last 7 days   Lab Units 09/09/23  0507 09/08/23  0828   WBC Thousand/uL 6.16 6.55   HEMOGLOBIN g/dL 11.5 12.0   HEMATOCRIT % 36.6 39.4   PLATELETS Thousands/uL 187 213         Results from last 7 days   Lab Units 09/09/23  0507 09/08/23  0828   POTASSIUM mmol/L 3.5 4.0   CHLORIDE mmol/L 106 108   CO2 mmol/L 28 27   BUN mg/dL 14 15 CREATININE mg/dL 1.13 1.50*   CALCIUM mg/dL 8.5 8.8   ALK PHOS U/L 89 103   ALT U/L 38 50   AST U/L 21 37         Results from last 7 days   Lab Units 09/09/23  0507 09/08/23  0828   MAGNESIUM mg/dL 2.0 2.0       Vitals: Blood pressure 128/89, pulse 61, temperature 97.7 °F (36.5 °C), resp. rate 18, height 5' 8" (1.727 m), weight 115 kg (254 lb), SpO2 95 %. , Body mass index is 38.62 kg/m².,   Orthostatic Blood Pressures    Flowsheet Row Most Recent Value   Blood Pressure 128/89 filed at 09/10/2023 0855   Patient Position - Orthostatic VS Sitting filed at 09/08/2023 0304          Systolic (38OES), XNL:940 , Min:128 , RMT:932     Diastolic (81KTO), AOL:21, Min:75, Max:89        Intake/Output Summary (Last 24 hours) at 9/10/2023 1006  Last data filed at 9/9/2023 1300  Gross per 24 hour   Intake 460 ml   Output 1200 ml   Net -740 ml       Invasive Devices     Peripheral Intravenous Line  Duration           Peripheral IV 09/08/23 Right Antecubital 1 day                  EKG:  Sinus rhythm with premature atrial complexes with ventricular escape complexes    Telemetry:  Telemetry Orders (From admission, onward)             24 Hour Telemetry Monitoring  Continuous x 24 Hours (Telem)        Question:  Reason for 24 Hour Telemetry  Answer:  Arrhythmias requiring acute medical intervention / PPM or ICD malfunction                 Telemetry Reviewed: Normal Sinus Rhythm     BP Readings from Last 3 Encounters:   09/10/23 128/89      Wt Readings from Last 3 Encounters:   09/10/23 115 kg (254 lb)

## 2023-09-10 NOTE — UTILIZATION REVIEW
Initial Clinical Review    Admission: Date/Time/Statement:   Admission Orders (From admission, onward)     Ordered        09/08/23 1051  Inpatient Admission  Once                      Orders Placed This Encounter   Procedures   • Inpatient Admission     Standing Status:   Standing     Number of Occurrences:   1     Order Specific Question:   Level of Care     Answer:   Level 2 Stepdown / HOT [14]     Order Specific Question:   Estimated length of stay     Answer:   More than 2 Midnights     Order Specific Question:   Certification     Answer:   I certify that inpatient services are medically necessary for this patient for a duration of greater than two midnights. See H&P and MD Progress Notes for additional information about the patient's course of treatment. ED Arrival Information     Expected   -    Arrival   9/8/2023 08:13    Acuity   Emergent            Means of arrival   Walk-In    Escorted by   Family Member    Service   Hospitalist    Admission type   Emergency            Arrival complaint   SOB           Chief Complaint   Patient presents with   • Palpitations     Pt c/o feeling like her heart in racing since Monday, pt states it feels worse while laying down        Initial Presentation: 63 y.o. female to ED presents for shortness of breath and palpitations. Per pt,  has been an ongoing issue for the past several months. She was recently in [facility] and developed the symptoms they are however did not seek medical attention. Symptoms resolved spontaneously. 4 days ago they returned and since then she has been having progressive dyspnea and palpitations. In ED,  found to have a narrow complex tachycardia to the 160s. Received metoprolol 5 mg IV x2 without any effect. Pt recently on a round of antibiotics after tooth extraction. No reported PMH. Admit Inpatient level of care for Atrial flutter, Fluid overload, INGRID and Pulmonary nodules.  Started Iv Diltiazem drip with bolus currently heart rates are better controlled (80s to 90s). Also given a dose of po diltiazem 60 mg. Plan is to  titrate off the drip in the next hour  Cardiology consulted. BNP elevated. CTAP showed mild pleural effusions. Given Iv Lasix 40 mg. Repeat TTE. Creat 1.5, no baseline available. Repeat BMP next AM. Hold on fluids for now. 9/8  Cardiology cons; Pt converted back to NSR with Iv Cardizem drip. Start cardizem  mg/day. Start aspirin. Check TTE and home sleep study. Date: 9/9   Day 2:   Per Cardiology; Pt maintaining NSR. Continue Cardizem  mg daily. TTE pending. Continue aspirin. Monitor off diuretics. Continue tele monitoring. Progress notes; Continue Cardizem to 120 mg daily, now rate controlled. Tele monitoring, f/u Echo.      ED Triage Vitals   Temperature Pulse Respirations Blood Pressure SpO2   09/08/23 0825 09/08/23 0821 09/08/23 0821 09/08/23 0821 09/08/23 0821   98 °F (36.7 °C) (!) 160 19 145/94 97 %      Temp Source Heart Rate Source Patient Position - Orthostatic VS BP Location FiO2 (%)   09/08/23 0825 09/08/23 0821 09/08/23 0821 09/08/23 0821 --   Temporal Monitor Sitting Right arm       Pain Score       09/08/23 1958       No Pain          Wt Readings from Last 1 Encounters:   09/10/23 115 kg (254 lb)     Additional Vital Signs:   09/09/23 08:03:30 98.1 °F (36.7 °C) 70 17 132/85 101 97 % -- -- -- --   09/09/23 0223 98 °F (36.7 °C) 71 16 125/80 90 96 % -- -- -- --   09/08/23 2217 -- 72 -- 124/79 -- 97 % -- -- -- --   09/08/23 1901 98.2 °F (36.8 °C) 76 18 119/69 86 96 % -- -- None (Room air) --   09/08/23 1527 -- 59 16 115/74 -- 100 % 28 2 L/min Nasal cannula --   09/08/23 1521 -- 60 14 97/65 -- 100 % 28 2 L/min Nasal cannula --   09/08/23 1510 -- 55 16 96/61 -- 100 % -- -- -- --   09/08/23 1500 -- 160   Abnormal  16 104/75 85 99 % -- -- -- --   09/08/23 1417 -- 159   Abnormal  18 123/84 -- 98 % 28 2 L/min Nasal cannula --   09/08/23 1243 -- 157   Abnormal  18 114/86 -- 98 % 28 2 L/min Nasal cannula -- 09/08/23 1143 -- 80 18 130/80 -- 99 % 28 2 L/min Nasal cannula --     Pertinent Labs/Diagnostic Test Results:   CT pe study w abdomen pelvis w contrast   Final Result by Yumiko Newby MD (09/08 8057)      1. No pulmonary embolism. 2.  Mild pulmonary edema and small bilateral pleural effusions. 3.  No acute findings in the abdomen or pelvis. 4.  Dual small groundglass nodules in the right upper lobe, larger being 8 mm. Per Fleischner Society guidelines on the management of incidentally detected pulmonary nodules, recommend follow-up chest CT in 3-6 months. The study was marked in EPIC for significant notification. Workstation performed: [id number]         XR chest 1 view portable   Final Result by Mirella Gilbert MD (09/08 2656)      Possible pulmonary vascular congestion. Correlation with symptoms and BNP is advised.                   Workstation performed: [id number]           Results from last 7 days   Lab Units 09/08/23  0932   SARS-COV-2  Negative     Results from last 7 days   Lab Units 09/09/23  0507 09/08/23  0828   WBC Thousand/uL 6.16 6.55   HEMOGLOBIN g/dL 11.5 12.0   HEMATOCRIT % 36.6 39.4   PLATELETS Thousands/uL 187 213   NEUTROS ABS Thousands/µL 3.13  --          Results from last 7 days   Lab Units 09/09/23  0507 09/08/23  0943 09/08/23  0828   SODIUM mmol/L 140  --  140   POTASSIUM mmol/L 3.5  --  4.0   CHLORIDE mmol/L 106  --  108   CO2 mmol/L 28  --  27   ANION GAP mmol/L 6  --  5   BUN mg/dL 14  --  15   CREATININE mg/dL 1.13  --  1.50*   EGFR ml/min/1.73sq m 52  --  37   CALCIUM mg/dL 8.5  --  8.8   CALCIUM, IONIZED mmol/L  --  1.11*  --    MAGNESIUM mg/dL 2.0  --  2.0   PHOSPHORUS mg/dL  --   --  4.0     Results from last 7 days   Lab Units 09/09/23  0507 09/08/23  0828   AST U/L 21 37   ALT U/L 38 50   ALK PHOS U/L 89 103   TOTAL PROTEIN g/dL 6.7 7.0   ALBUMIN g/dL 3.4* 3.5   TOTAL BILIRUBIN mg/dL 0.84 0.86         Results from last 7 days   Lab Units 09/09/23  5512 09/08/23  0828   GLUCOSE RANDOM mg/dL 93 96         Results from last 7 days   Lab Units 09/08/23  1252 09/08/23  1107 09/08/23  0828   HS TNI 0HR ng/L  --   --  20   HS TNI 2HR ng/L  --  20  --    HSTNI D2 ng/L  --  0  --    HS TNI 4HR ng/L 16  --   --    HSTNI D4 ng/L -4  --   --      Results from last 7 days   Lab Units 09/08/23  0830   D-DIMER QUANTITATIVE ug/ml FEU 2.66*     Results from last 7 days   Lab Units 09/10/23  1050   PROTIME seconds 13.3   INR  0.96     Results from last 7 days   Lab Units 09/08/23  0828   TSH 3RD GENERATON uIU/mL 1.550                     Results from last 7 days   Lab Units 09/08/23  0828   BNP pg/mL 465*       Results from last 7 days   Lab Units 09/08/23  0932   INFLUENZA A PCR  Negative   INFLUENZA B PCR  Negative   RSV PCR  Negative       ED Treatment:   Medication Administration from 09/08/2023 0813 to 09/08/2023 1852       Date/Time Order Dose Route Action     09/08/2023 0830 EDT metoprolol (LOPRESSOR) injection 10 mg 10 mg Intravenous Given     09/08/2023 0922 EDT iohexol (OMNIPAQUE) 350 MG/ML injection (SINGLE-DOSE) 100 mL 100 mL Intravenous Given     09/08/2023 0954 EDT diltiazem (CARDIZEM) injection 27.5 mg 27.5 mg Intravenous Given     09/08/2023 1059 EDT diltiazem (CARDIZEM) injection 27.5 mg 27.5 mg Intravenous Given     09/08/2023 1527 EDT diltiazem (CARDIZEM) 125 mg in sodium chloride 0.9 % 125 mL infusion 5 mg/hr Intravenous Rate/Dose Change     09/08/2023 1518 EDT diltiazem (CARDIZEM) 125 mg in sodium chloride 0.9 % 125 mL infusion 12.5 mg/hr Intravenous Rate/Dose Change     09/08/2023 1435 EDT diltiazem (CARDIZEM) 125 mg in sodium chloride 0.9 % 125 mL infusion 15 mg/hr Intravenous Rate/Dose Change     09/08/2023 1416 EDT diltiazem (CARDIZEM) 125 mg in sodium chloride 0.9 % 125 mL infusion 12.5 mg/hr Intravenous Rate/Dose Change     09/08/2023 1350 EDT diltiazem (CARDIZEM) 125 mg in sodium chloride 0.9 % 125 mL infusion 10 mg/hr Intravenous Rate/Dose Change 09/08/2023 1310 EDT diltiazem (CARDIZEM) 125 mg in sodium chloride 0.9 % 125 mL infusion 7.5 mg/hr Intravenous Rate/Dose Change     09/08/2023 1245 EDT diltiazem (CARDIZEM) 125 mg in sodium chloride 0.9 % 125 mL infusion 5 mg/hr Intravenous Rate/Dose Change     09/08/2023 1103 EDT diltiazem (CARDIZEM) 125 mg in sodium chloride 0.9 % 125 mL infusion 2.5 mg/hr Intravenous New Bag     09/08/2023 1037 EDT furosemide (LASIX) injection 40 mg 40 mg Intravenous Given     09/08/2023 1040 EDT diltiazem (CARDIZEM) tablet 60 mg 60 mg Oral Given     09/08/2023 1619 EDT heparin (porcine) subcutaneous injection 5,000 Units 5,000 Units Subcutaneous Given     09/08/2023 1458 EDT diltiazem (CARDIZEM) injection 10 mg 10 mg Intravenous Given     09/08/2023 1618 EDT aspirin (ECOTRIN LOW STRENGTH) EC tablet 81 mg 81 mg Oral Given     09/08/2023 1619 EDT furosemide (LASIX) injection 20 mg 20 mg Intravenous Given     09/08/2023 1618 EDT diltiazem (CARDIZEM CD) 24 hr capsule 120 mg 120 mg Oral Given        Past Medical History:   Diagnosis Date   • INGRID (acute kidney injury) ([address]) 9/8/2023   • Hypertension      Present on Admission:  **None**      Admitting Diagnosis: Shortness of breath [R06.02]  Pulmonary edema [J81.1]  HTN (hypertension) [I10]  SOB (shortness of breath) [R06.02]  Pleural effusion [J90]  Tachycardia [R00.0]  Obesity [E66.9]  Incidental lung nodule, greater than or equal to 8mm [R91.1]  Atrial flutter with rapid ventricular response (HCC) [I48.92]  Elevated d-dimer [R79.89]  New onset atrial flutter ([address]) [I48.92]  Age/Sex: 63 y.o. female     Admission Orders:  Scheduled Medications:  diltiazem, 120 mg, Oral, Daily  warfarin, 5 mg, Oral, Once (warfarin)      Continuous IV Infusions:    diltiazem (CARDIZEM) 125 mg in sodium chloride 0.9 % 125 mL infusion  Rate: 1-15 mL/hr Dose: 1-15 mg/hr  Freq: Continuous Route: IV  Last Dose: Stopped (09/08/23 1535)  Start: 09/08/23 1015 End: 09/08/23 1538      PRN Meds: None       IP CONSULT TO CARDIOLOGY    Network Utilization Review Department  ATTENTION: Please call with any questions or concerns to [phone] and carefully listen to the prompts so that you are directed to the right person. All voicemails are confidential.  Mirian Zepeda all requests for admission clinical reviews, approved or denied determinations and any other requests to dedicated fax number below belonging to the campus where the patient is receiving treatment.  List of dedicated fax numbers for the Facilities:  [place] DENIALS (Administrative/Medical Necessity) [phone] [facility] [phone]   [address] [place] [address] [phone]   [facility] [address] [facility] [phone]   [address] [place] [address] [address] [phone]   [address] [address] [place] [address] Nn [phone]

## 2023-09-10 NOTE — UTILIZATION REVIEW
NOTIFICATION OF INPATIENT ADMISSION   AUTHORIZATION REQUEST   [facility]AC[facility] [address] [place] [address]  Tax ID: [id number]  NPI: [id number] ATTENDING PROVIDER:  Attending Name and NPI#: Rocky Bhatia [[id number]]  Address: Cheko Zazueta, [address] [place] [address]  Phone: [phone]     ADMISSION INFORMATION:  Place of Service: [facility]  Place of Service Code: 21  Inpatient Admission Date/Time: 23 10:51 AM  Discharge Date/Time: No discharge date for patient encounter. Admitting Diagnosis Code/Description:  Shortness of breath [R06.02]  Pulmonary edema [J81.1]  HTN (hypertension) [I10]  SOB (shortness of breath) [R06.02]  Pleural effusion [J90]  Tachycardia [R00.0]  Obesity [E66.9]  Incidental lung nodule, greater than or equal to 8mm [R91.1]  Atrial flutter with rapid ventricular response ([address]) [I48.92]  Elevated d-dimer [R79.89]  New onset atrial flutter ([address]) [I48.92]     UTILIZATION REVIEW CONTACT:  Carmen Betts, Utilization   Network Utilization Review Department  Phone: [phone]  Fax [phone]  Email: [email or web address]  Contact for approvals/pending authorizations, clinical reviews, and discharge. PHYSICIAN ADVISORY SERVICES:  Medical Necessity Denial & [id number] Review  Phone: [phone]  Fax: [phone]  Email: [email or web address]

## 2023-09-10 NOTE — PLAN OF CARE
Problem: PAIN - ADULT  Goal: Verbalizes/displays adequate comfort level or baseline comfort level  Description: Interventions:  - Encourage patient to monitor pain and request assistance  - Assess pain using appropriate pain scale  - Administer analgesics based on type and severity of pain and evaluate response  - Implement non-pharmacological measures as appropriate and evaluate response  - Consider cultural and social influences on pain and pain management  - Notify physician/advanced practitioner if interventions unsuccessful or patient reports new pain  Outcome: Progressing     Problem: INFECTION - ADULT  Goal: Absence or prevention of progression during hospitalization  Description: INTERVENTIONS:  - Assess and monitor for signs and symptoms of infection  - Monitor lab/diagnostic results  - Monitor all insertion sites, i.e. indwelling lines, tubes, and drains  - Monitor endotracheal if appropriate and nasal secretions for changes in amount and color  - [place] appropriate cooling/warming therapies per order  - Administer medications as ordered  - Instruct and encourage patient and family to use good hand hygiene technique  - Identify and instruct in appropriate isolation precautions for identified infection/condition  Outcome: Progressing  Goal: Absence of fever/infection during neutropenic period  Description: INTERVENTIONS:  - Monitor WBC    Outcome: Progressing     Problem: SAFETY ADULT  Goal: Patient will remain free of falls  Description: INTERVENTIONS:  - Educate patient/family on patient safety including physical limitations  - Instruct patient to call for assistance with activity   - Consult OT/PT to assist with strengthening/mobility   - Keep Call bell within reach  - Keep bed low and locked with side rails adjusted as appropriate  - Keep care items and personal belongings within reach  - Initiate and maintain comfort rounds  - Make Fall Risk Sign visible to staff  - Offer Toileting every 2 Hours, in advance of need  - Initiate/Maintain bed alarm  - Obtain necessary fall risk management equipmen  - Apply yellow socks and bracelet for high fall risk patients  - Consider moving patient to room near nurses station  Outcome: Progressing  Goal: Maintain or return to baseline ADL function  Description: INTERVENTIONS:  -  Assess patient's ability to carry out ADLs; assess patient's baseline for ADL function and identify physical deficits which impact ability to perform ADLs (bathing, care of mouth/teeth, toileting, grooming, dressing, etc.)  - Assess/evaluate cause of self-care deficits   - Assess range of motion  - Assess patient's mobility; develop plan if impaired  - Assess patient's need for assistive devices and provide as appropriate  - Encourage maximum independence but intervene and supervise when necessary  - Involve family in performance of ADLs  - Assess for home care needs following discharge   - Consider OT consult to assist with ADL evaluation and planning for discharge  - Provide patient education as appropriate  Outcome: Progressing  Goal: Maintains/Returns to pre admission functional level  Description: INTERVENTIONS:  - Perform BMAT or MOVE assessment daily.   - Set and communicate daily mobility goal to care team and patient/family/caregiver. - Collaborate with rehabilitation services on mobility goals if consulted  - Perform Range of Motion 2 times a day. - Reposition patient every 2 hours.   - Dangle patient 2 times a day  - Stand patient 2 times a day  - Ambulate patient 2 times a day  - Out of bed to chair 2 times a day   - Out of bed for meals 2 times a day  - Out of bed for toileting  - Record patient progress and toleration of activity level   Outcome: Progressing     Problem: DISCHARGE PLANNING  Goal: Discharge to home or other facility with appropriate resources  Description: INTERVENTIONS:  - Identify barriers to discharge w/patient and caregiver  - Arrange for needed discharge resources and transportation as appropriate  - Identify discharge learning needs (meds, wound care, etc.)  - Arrange for interpretive services to assist at discharge as needed  - Refer to Case Management Department for coordinating discharge planning if the patient needs post-hospital services based on physician/advanced practitioner order or complex needs related to functional status, cognitive ability, or social support system  Outcome: Progressing     Problem: Knowledge Deficit  Goal: Patient/family/caregiver demonstrates understanding of disease process, treatment plan, medications, and discharge instructions  Description: Complete learning assessment and assess knowledge base.   Interventions:  - Provide teaching at level of understanding  - Provide teaching via preferred learning methods  Outcome: Progressing     Problem: CARDIOVASCULAR - ADULT  Goal: Maintains optimal cardiac output and hemodynamic stability  Description: INTERVENTIONS:  - Monitor I/O, vital signs and rhythm  - Monitor for S/S and trends of decreased cardiac output  - Administer and titrate ordered vasoactive medications to optimize hemodynamic stability  - Assess quality of pulses, skin color and temperature  - Assess for signs of decreased coronary artery perfusion  - Instruct patient to report change in severity of symptoms  Outcome: Progressing  Goal: Absence of cardiac dysrhythmias or at baseline rhythm  Description: INTERVENTIONS:  - Continuous cardiac monitoring, vital signs, obtain 12 lead EKG if ordered  - Administer antiarrhythmic and heart rate control medications as ordered  - Monitor electrolytes and administer replacement therapy as ordered  Outcome: Progressing

## 2023-09-10 NOTE — ASSESSMENT & PLAN NOTE
· Presented to the ED with heart palpitations that have been ongoing for the last several months. Worsened in the last week. · In the ER found to have a narrow complex tachycardia to the 160s.   Received metoprolol 5 mg IV x2 without any effect  · Started on diltiazem drip with bolus currently heart rates are better controlled  · Also given a dose of oral diltiazem 60 mg  · Now rate controlled, increased cardizem to 120 mg daily  · ECHO showing grade 2 diastolic dysfunction  · Follow up with cardiology as an outpatietn   · [id number] score 3- started on coumadin, will follow up at coumadin clinic

## 2023-09-10 NOTE — DISCHARGE SUMMARY
[address]  Discharge- Aripeka Finder 1960, 63 y.o. female MRN: [id number]  Unit/Bed#: -Mark Encounter: [id number]  Primary Care Provider: No primary care provider on file. Date and time admitted to hospital: 9/8/2023  8:16 AM    * Atrial fibrillation [facility])  Assessment & Plan  · Presented to the ED with heart palpitations that have been ongoing for the last several months. Worsened in the last week. · In the ER found to have a narrow complex tachycardia to the 160s. Received metoprolol 5 mg IV x2 without any effect  · Started on diltiazem drip with bolus currently heart rates are better controlled  · Also given a dose of oral diltiazem 60 mg  · Now rate controlled, increased cardizem to 120 mg daily  · ECHO showing grade 2 diastolic dysfunction  · Follow up with cardiology as an outpatietn   · [id number] score 3- started on coumadin, will follow up at coumadin clinic    Pulmonary nodules  Assessment & Plan  · Incidentally seen on CT, largest is 8 mm  · Discussed with patient and   · Repeat CT scan recommended in 3 to 6 months    Fluid overload  Assessment & Plan  · Chest x-ray with possible vascular congestion  · BNP elevated  · CTAP showed mild pleural effusions  · Likely in the setting of new onset afib  · Received Lasix 40 mg IV  · Euvolemic on exam  · ECHO showing diastolic dysfunction  · Resolved      Medical Problems     Resolved Problems  Never Reviewed          Resolved    Tachycardia 9/8/2023     Resolved by  Alexy Hawkins MD    INGRID (acute kidney injury) ([address]) 9/9/2023     Resolved by  Natan Aguilar MD        Discharging Physician / Practitioner: Yariel Rosado MD  PCP: No primary care provider on file.   Admission Date:   Admission Orders (From admission, onward)     Ordered        09/08/23 1051  Inpatient Admission  Once                      Discharge Date: 09/10/23    Consultations During Hospital Stay:  [address]  ·     Procedures Performed:   · imaging    Significant Findings / Test Results:   Principal Problem:    Atrial fibrillation (HCC)  Active Problems:    Fluid overload    Pulmonary nodules  Resolved Problems:    Tachycardia    INGRID (acute kidney injury) ([address])    ·     Incidental Findings:   · See above      Test Results Pending at Discharge (will require follow up):   · na     Outpatient Tests Requested:  · Follow up with cardiology     Complications:  na    Reason for Admission: palpitations    Hospital Course:   Cas Batista is a 63 y.o. female patient who originally presented to the hospital on 9/8/2023 due to palpitations secondary to new onset afib. Converted back to sinus rhythm with initiation of cardizem. Rate controlled on discharge. ECHO showing grade 2 diastolic dysfunction. Condition at Discharge: good    Discharge Day Visit / Exam:   * Please refer to separate progress note for these details *      Discharge instructions/Information to patient and family:   See after visit summary for information provided to patient and family. Provisions for Follow-Up Care:  See after visit summary for information related to follow-up care and any pertinent home health orders. Disposition:   Home    Planned Readmission: none      Discharge Statement:  I spent 30+ minutes discharging the patient. This time was spent on the day of discharge. I had direct contact with the patient on the day of discharge. Greater than 50% of the total time was spent examining patient, answering all patient questions, arranging and discussing plan of care with patient as well as directly providing post-discharge instructions. Additional time then spent on discharge activities. Discharge Medications:  See after visit summary for reconciled discharge medications provided to patient and/or family.       **Please Note: This note may have been constructed using a voice recognition system**

## 2023-09-10 NOTE — DISCHARGE INSTR - AVS FIRST PAGE
Dear Lalitha Sidhu,     It was our pleasure to care for you here at [facility]. It is our hope that we were always able to exceed the expected standards for your care during your stay. You were hospitalized due to atrial fibrillation. You were cared for on the 4th floor by Jem Vasquez MD under the service of Jorge L He MD with the [facility] who covers for your primary care physician (PCP), No primary care provider on file. , while you were hospitalized. If you have any questions or concerns related to this hospitalization, you may contact us at [phone]. For follow up as well as any medication refills, we recommend that you follow up with your primary care physician. A registered nurse will reach out to you by phone within a few days after your discharge to answer any additional questions that you may have after going home. However, at this time we provide for you here, the most important instructions / recommendations at discharge:     Notable Medication Adjustments -   Added coumadin (warfarin) - blood thinner  Added cardizem (Diltiazem) - heart rate control medication  Testing Required after Discharge -   Sleep study is needed, follow up with your primary care provider to get a referral   Important follow up information -   Follow up with coumadin clinic and cardiology  Other Instructions -   Return to ED if symptoms return/worsen  Please review this entire after visit summary as additional general instructions including medication list, appointments, activity, diet, any pertinent wound care, and other additional recommendations from your care team that may be provided for you.       Sincerely,     Jem Vasquez MD

## 2023-09-11 ENCOUNTER — TELEPHONE (OUTPATIENT)
Dept: CARDIOLOGY CLINIC | Facility: CLINIC | Age: 63
End: 2023-09-11

## 2023-09-11 NOTE — UTILIZATION REVIEW
NOTIFICATION OF ADMISSION DISCHARGE   This is a Notification of Discharge from [facility]. Please be advised that this patient has been discharge from our facility. Below you will find the admission and discharge date and time including the patient’s disposition. UTILIZATION REVIEW CONTACT:  Nando Keane  Utilization   Network Utilization Review Department  Phone: [phone] x carefully listen to the prompts. All voicemails are confidential.  Email: [email or web address]     ADMISSION INFORMATION  PRESENTATION DATE: 9/8/2023  8:16 AM    INPATIENT ADMISSION DATE: 9/8/23 10:51 AM   DISCHARGE DATE: 9/10/2023  5:12 PM   DISPOSITION:Home/Self Care    IMPORTANT INFORMATION:  Send all requests for admission clinical reviews, approved or denied determinations and any other requests to dedicated fax number below belonging to the campus where the patient is receiving treatment.  List of dedicated fax numbers:  [place] DENIALS (Administrative/Medical Necessity) [phone] [address] [facility] (Maternity/NICU/Pediatrics) [phone]   [facility] [phone]   [facility] [phone] [address] [facility] [phone]   [address] [place] [address] [phone]   [facility] [phone]   [address] [facility] [address] [place] [address] [phone]   [address] [place] [address] [phone]   [address] [place] [address] [phone]   [facility] [place] [address] [facility] [phone]           Eleni Weiss MD  Physician  Hospitalist  Discharge Summary      Signed  Date of Service:  9/10/2023 12:21 PM     Signed          [address]  Discharge- Marcos Betts 1960, 63 y.o. female MRN: [id number]  Unit/Bed#: -01 Encounter: [id number]  Primary Care Provider: No primary care provider on file. Date and time admitted to hospital: 9/8/2023  8:16 AM     * Atrial fibrillation [facility])  Assessment & Plan  • Presented to the ED with heart palpitations that have been ongoing for the last several months. Worsened in the last week. • In the ER found to have a narrow complex tachycardia to the 160s. Received metoprolol 5 mg IV x2 without any effect  • Started on diltiazem drip with bolus currently heart rates are better controlled  • Also given a dose of oral diltiazem 60 mg  • Now rate controlled, increased cardizem to 120 mg daily  • ECHO showing grade 2 diastolic dysfunction  • Follow up with cardiology as an outpatietn   • [id number] score 3- started on coumadin, will follow up at coumadin clinic     Pulmonary nodules  Assessment & Plan  • Incidentally seen on CT, largest is 8 mm  • Discussed with patient and   • Repeat CT scan recommended in 3 to 6 months     Fluid overload  Assessment & Plan  • Chest x-ray with possible vascular congestion  • BNP elevated  • CTAP showed mild pleural effusions  • Likely in the setting of new onset afib  • Received Lasix 40 mg IV  • Euvolemic on exam  • ECHO showing diastolic dysfunction  • Resolved            Medical Problems              Resolved Problems  Never Reviewed           Resolved     Tachycardia 9/8/2023       Resolved by  Anamaria Newby MD     INGRID (acute kidney injury) ([address]) 9/9/2023       Resolved by  David Stokes MD         Discharging Physician / Practitioner: Emerita Tejada MD  PCP: No primary care provider on file.   Admission Date:       Admission Orders (From admission, onward)       Ordered         09/08/23 1051   Inpatient Admission  Once                         Discharge Date: 09/10/23     Consultations During Hospital Stay:  • IP CONSULT TO CARDIOLOGY  •       Procedures Performed:   • imaging     Significant Findings / Test Results:   • Principal Problem:  •   Atrial fibrillation [facility])  • Active Problems:  •   Fluid overload  •   Pulmonary nodules  • Resolved Problems:  •   Tachycardia  •   INGRID (acute kidney injury) ([address])  •    •       Incidental Findings:   • See above       Test Results Pending at Discharge (will require follow up):   • na     Outpatient Tests Requested:  • Follow up with cardiology      Complications:  na     Reason for Admission: palpitations     Hospital Course:   Jeremiah Godinez is a 63 y.o. female patient who originally presented to the hospital on 9/8/2023 due to palpitations secondary to new onset afib. Converted back to sinus rhythm with initiation of cardizem. Rate controlled on discharge. ECHO showing grade 2 diastolic dysfunction.      Condition at Discharge: good     Discharge Day Visit / Exam:   * Please refer to separate progress note for these details *        Discharge instructions/Information to patient and family:   See after visit summary for information provided to patient and family.       Provisions for Follow-Up Care:  See after visit summary for information related to follow-up care and any pertinent home health orders. Disposition:   Home     Planned Readmission: none      Discharge Statement:  I spent 30+ minutes discharging the patient. This time was spent on the day of discharge. I had direct contact with the patient on the day of discharge. Greater than 50% of the total time was spent examining patient, answering all patient questions, arranging and discussing plan of care with patient as well as directly providing post-discharge instructions.   Additional time then spent on discharge activities.     Discharge Medications:  See after visit summary for reconciled discharge medications provided to patient and/or family.       **Please Note: This note may have been constructed using a voice recognition system**

## 2023-09-11 NOTE — TELEPHONE ENCOUNTER
----- Message from Steve Elizabeth PA-C sent at 9/10/2023 12:42 PM EDT -----  Regarding: Hosp f/u CHF, Coumadin  Patient clinical visit in 1 week at the Cardio location: [place] office. .    Schedule visit with Cardio [facility] Providers: first available provider.    Type of Visit: VISIT TYPE: in-person office visit.    Additional Details: New onset valvular A-flutter, acute HFpEF        Patient was initiated on Coumadin - please establish with the Coumadin clinic, thank you!

## 2023-09-15 ENCOUNTER — APPOINTMENT (OUTPATIENT)
Dept: LAB | Facility: HOSPITAL | Age: 63
End: 2023-09-15
Attending: INTERNAL MEDICINE
Payer: COMMERCIAL

## 2023-09-15 ENCOUNTER — OFFICE VISIT (OUTPATIENT)
Dept: CARDIOLOGY CLINIC | Facility: CLINIC | Age: 63
End: 2023-09-15
Payer: COMMERCIAL

## 2023-09-15 ENCOUNTER — TELEPHONE (OUTPATIENT)
Dept: CARDIOLOGY CLINIC | Facility: CLINIC | Age: 63
End: 2023-09-15

## 2023-09-15 ENCOUNTER — ANTICOAG VISIT (OUTPATIENT)
Dept: CARDIOLOGY CLINIC | Facility: CLINIC | Age: 63
End: 2023-09-15

## 2023-09-15 VITALS
OXYGEN SATURATION: 96 % | BODY MASS INDEX: 37.74 KG/M2 | HEIGHT: 68 IN | RESPIRATION RATE: 16 BRPM | DIASTOLIC BLOOD PRESSURE: 82 MMHG | SYSTOLIC BLOOD PRESSURE: 128 MMHG | HEART RATE: 65 BPM | WEIGHT: 249 LBS

## 2023-09-15 DIAGNOSIS — I48.92 ATRIAL FLUTTER WITH RAPID VENTRICULAR RESPONSE (HCC): ICD-10-CM

## 2023-09-15 DIAGNOSIS — Z79.01 ANTICOAGULATION MONITORING, INR RANGE 2-3: Primary | ICD-10-CM

## 2023-09-15 DIAGNOSIS — I48.0 PAROXYSMAL ATRIAL FIBRILLATION (HCC): ICD-10-CM

## 2023-09-15 DIAGNOSIS — I05.1 RHEUMATIC MITRAL REGURGITATION: ICD-10-CM

## 2023-09-15 DIAGNOSIS — I48.0 PAROXYSMAL ATRIAL FIBRILLATION (HCC): Primary | ICD-10-CM

## 2023-09-15 DIAGNOSIS — I05.0 RHEUMATIC MITRAL STENOSIS: ICD-10-CM

## 2023-09-15 LAB
INR PPP: 0.98 (ref 0.84–1.19)
PROTHROMBIN TIME: 13.6 SECONDS (ref 11.6–14.5)

## 2023-09-15 PROCEDURE — 85610 PROTHROMBIN TIME: CPT | Performed by: INTERNAL MEDICINE

## 2023-09-15 PROCEDURE — 36415 COLL VENOUS BLD VENIPUNCTURE: CPT | Performed by: INTERNAL MEDICINE

## 2023-09-15 PROCEDURE — 93000 ELECTROCARDIOGRAM COMPLETE: CPT | Performed by: INTERNAL MEDICINE

## 2023-09-15 PROCEDURE — 99213 OFFICE O/P EST LOW 20 MIN: CPT | Performed by: INTERNAL MEDICINE

## 2023-09-15 NOTE — RESULT ENCOUNTER NOTE
S/w the pt. She states that she is taking 5mg daily as directed. Advised to take 7.5mg M//W/F, 5mg the rest and retest in 1 week. She was not in a place to write this down and does not have Mychart. States she will call me back when she gets home.

## 2023-09-15 NOTE — PROGRESS NOTES
@1:39pm-S/w the pt. She states that she is taking 5mg daily as directed. Advised to take 7.5mg M//W/F, 5mg the rest and retest in 1 week. She was not in a place to write this down and does not have Mychart. States she will call me back when she gets home.

## 2023-09-15 NOTE — PROGRESS NOTES
Cardiology Follow Up    Yoselyn Bravo  1960  [id number]  [facility]  [address] [address] [facility] [address] [phone]    1. Paroxysmal atrial fibrillation (HCC)  -     POCT ECG  -     Protime-INR    2. Rheumatic mitral regurgitation    3. Rheumatic mitral stenosis          Interval History: 71-year-old retired  from [address] [place] [address] who was recently in the hospital with atrial flutter, mild mitral stenosis, moderate mitral regurgitation. At that time she had rapid heartbeat sensation as well as shortness of breath. She states she has had known to have a heart murmur for many years. She denies rheumatic fever or frequent sore throats as a youngster. She does not smoke. She was sent home on Coumadin but has not had a pro time checked since discharge. She denies shortness of breath at this time. She does not awaken with shortness of breath.     Patient Active Problem List   Diagnosis   • Atrial fibrillation (HCC)   • Fluid overload   • Pulmonary nodules   • Rheumatic mitral regurgitation   • Rheumatic mitral stenosis     Past Medical History:   Diagnosis Date   • INGRID (acute kidney injury) ([address]) 9/8/2023   • Hypertension      Social History     Socioeconomic History   • Marital status: /Civil Union     Spouse name: Not on file   • Number of children: Not on file   • Years of education: Not on file   • Highest education level: Not on file   Occupational History   • Not on file   Tobacco Use   • Smoking status: Never   • Smokeless tobacco: Never   Substance and Sexual Activity   • Alcohol use: Never   • Drug use: Never   • Sexual activity: Not on file   Other Topics Concern   • Not on file   Social History Narrative   • Not on file     Social Determinants of Health     Financial Resource Strain: Not on file   Food Insecurity: Not on file   Transportation Needs: Not on file   Physical Activity: Not on file   Stress: Not on file   Social Connections: Not on file   Intimate Partner Violence: Not on file   Housing Stability: Not on file      History reviewed. No pertinent family history.   Past Surgical History:   Procedure Laterality Date   •  SECTION         Current Outpatient Medications:   •  diltiazem (CARDIZEM CD) 120 mg 24 hr capsule, Take 1 capsule (120 mg total) by mouth daily Do not start before 2023., Disp: 30 capsule, Rfl: 0  •  folic acid (FOLVITE) 1 mg tablet, Take 1 mg by mouth daily, Disp: , Rfl:   •  warfarin (COUMADIN) 5 mg tablet, Take 1 tablet (5 mg total) by mouth daily, Disp: 30 tablet, Rfl: 0  No Known Allergies    Labs:  Admission on 2023, Discharged on 09/10/2023   Component Date Value   • Ventricular Rate 2023 162    • Atrial Rate 2023 162    • ME Interval 2023 104    • QRSD Interval 2023 158    • QT Interval 2023 290    • QTC Interval 2023 476    • QRS Axis 2023 72    • T Wave Axis 2023 -52    • WBC 2023 6.55    • RBC 2023 4.21    • Hemoglobin 2023 12.0    • Hematocrit 2023 39.4    • MCV 2023 94    • MCH 2023 28.5    • MCHC 2023 30.5 (L)    • RDW 2023 13.3    • MPV 2023 11.2    • Platelets  213    • Sodium 2023 140    • Potassium 2023 4.0    • Chloride 2023 108    • CO2 2023 27    • ANION GAP 2023 5    • BUN 2023 15    • Creatinine 2023 1.50 (H)    • Glucose 2023 96    • Calcium 2023 8.8    • AST 2023 37    • ALT 2023 50    • Alkaline Phosphatase 2023 103    • Total Protein 2023 7.0    • Albumin 2023 3.5    • Total Bilirubin 2023 0.86    • eGFR 2023 37    • hs TnI 0hr 2023 20    • D-Dimer, Quant 2023 2.66 (H)    • Magnesium 2023 2.0    • Phosphorus 2023 4.0    • hs TnI 2hr 2023 20    • Delta 2hr hsTnI 09/08/2023 0    • SARS-CoV-2 09/08/2023 Negative    • INFLUENZA A PCR 09/08/2023 Negative    • INFLUENZA B PCR 09/08/2023 Negative    • RSV PCR 09/08/2023 Negative    • TSH 3RD GENERATON 09/08/2023 1.550    • BNP 09/08/2023 465 (H)    • Segmented % 09/08/2023 53    • Lymphocytes % 09/08/2023 36    • Monocytes % 09/08/2023 5    • Eosinophils, % 09/08/2023 2    • Basophils % 09/08/2023 0    • Atypical Lymphocytes % 09/08/2023 4 (H)    • Absolute Neutrophils 09/08/2023 3.47    • Lymphocytes Absolute 09/08/2023 2.62    • Monocytes Absolute 09/08/2023 0.33    • Eosinophils Absolute 09/08/2023 0.13    • Basophils Absolute 09/08/2023 0.00    • Platelet Estimate 49/18/2703 Adequate    • Polychromasia 09/08/2023 Present    • Calcium, Ionized 09/08/2023 1.11 (L)    • Ventricular Rate 09/08/2023 106    • Atrial Rate 09/08/2023 318    • QRSD Interval 09/08/2023 96    • QT Interval 09/08/2023 350    • QTC Interval 09/08/2023 464    • P Axis 09/08/2023 -70    • QRS Axis 09/08/2023 78    • T Wave Axis 09/08/2023 64    • hs TnI 4hr 09/08/2023 16    • Delta 4hr hsTnI 09/08/2023 -4    • Ventricular Rate 09/08/2023 105    • Atrial Rate 09/08/2023 315    • QRSD Interval 09/08/2023 80    • QT Interval 09/08/2023 168    • QTC Interval 09/08/2023 222    • P Axis 09/08/2023 71    • QRS Axis 09/08/2023 79    • T Wave Axis 09/08/2023 95    • Ventricular Rate 09/08/2023 107    • Atrial Rate 09/08/2023 321    • QRSD Interval 09/08/2023 96    • QT Interval 09/08/2023 352    • QTC Interval 09/08/2023 469    • P Axis 09/08/2023 255    • QRS Axis 09/08/2023 80    • T Wave Axis 09/08/2023 67    • Ventricular Rate 09/08/2023 62    • Atrial Rate 09/08/2023 62    • CO Interval 09/08/2023 160    • QRSD Interval 09/08/2023 80    • QT Interval 09/08/2023 408    • QTC Interval 09/08/2023 414    • P Axis 09/08/2023 37    • QRS Axis 09/08/2023 77    • T Wave Axis 09/08/2023 69    • Sodium 09/09/2023 140    • Potassium 09/09/2023 3.5    • Chloride 09/09/2023 106    • CO2 09/09/2023 28    • ANION GAP 09/09/2023 6    • BUN 09/09/2023 14    • Creatinine 09/09/2023 1.13    • Glucose 09/09/2023 93    • Calcium 09/09/2023 8.5    • Corrected Calcium 09/09/2023 9.0    • AST 09/09/2023 21    • ALT 09/09/2023 38    • Alkaline Phosphatase 09/09/2023 89    • Total Protein 09/09/2023 6.7    • Albumin 09/09/2023 3.4 (L)    • Total Bilirubin 09/09/2023 0.84    • eGFR 09/09/2023 52    • WBC 09/09/2023 6.16    • RBC 09/09/2023 3.99    • Hemoglobin 09/09/2023 11.5    • Hematocrit 09/09/2023 36.6    • MCV 09/09/2023 92    • MCH 09/09/2023 28.8    • MCHC 09/09/2023 31.4    • RDW 09/09/2023 13.6    • MPV 09/09/2023 11.1    • Platelets 04/84/5804 187    • nRBC 09/09/2023 0    • Neutrophils Relative 09/09/2023 51    • Immat GRANS % 09/09/2023 0    • Lymphocytes Relative 09/09/2023 39    • Monocytes Relative 09/09/2023 8    • Eosinophils Relative 09/09/2023 2    • Basophils Relative 09/09/2023 0    • Neutrophils Absolute 09/09/2023 3.13    • Immature Grans Absolute 09/09/2023 0.02    • Lymphocytes Absolute 09/09/2023 2.39    • Monocytes Absolute 09/09/2023 0.46    • Eosinophils Absolute 09/09/2023 0.14    • Basophils Absolute 09/09/2023 0.02    • Magnesium 09/09/2023 2.0    • LA size 09/10/2023 5.7    • Triscuspid Valve Regurgi* 09/10/2023 37.0    • Tricuspid valve peak reg* 09/10/2023 3.04    • LVPWd 09/10/2023 1.10    • Left Atrium Area-systoli* 09/10/2023 30.5    • Left Atrium Area-systoli* 09/10/2023 31.9    • MV E' Tissue Velocity Se* 09/10/2023 6    • Tricuspid annular plane * 09/10/2023 2.10    • TR Peak Gio 09/10/2023 3.0    • IVSd 09/10/2023 8.84    • LV DIASTOLIC VOLUME (MOD* 68/29/8967 87    • LEFT VENTRICLE SYSTOLIC * 79/10/9071 28    • Left ventricular stroke * 09/10/2023 59.00    • A4C EF 09/10/2023 65    • LA length (A2C) 09/10/2023 6.90    • LVIDd 09/10/2023 4.40    • IVS 09/10/2023 1.1    • LVIDS 09/10/2023 2.70    • FS 09/10/2023 39    • LA volume (BP) 09/10/2023 112    • Asc Ao 09/10/2023 3.2    • Ao root 09/10/2023 2.60    • RVID d 09/10/2023 3.9    • MV mean gradient antegra* 09/10/2023 6.0    • MV valve area p 1/2 meth* 09/10/2023 1.77    • E wave deceleration time 09/10/2023 427    • MV peak gradient antegra* 09/10/2023 13    • MV Peak E Gio 09/10/2023 167    • MV VTI 09/10/2023 50.02    • MV Peak A Gio 09/10/2023 1.03    • RAA A4C 09/10/2023 21.1    • MV stenosis pressure 1/2* 09/10/2023 124    • LVSV, 2D 09/10/2023 59    • LV EF 09/10/2023 60    • PASP 09/10/2023 40.0    • Protime 09/10/2023 13.3    • INR 09/10/2023 0.96      Imaging: Echo complete w/ contrast if indicated    Result Date: 9/10/2023  Narrative: •  Left Ventricle: Left ventricular cavity size is normal. Wall thickness is mildly increased. The left ventricular ejection fraction is 60%. Systolic function is normal. Wall motion is normal. Diastolic function is moderately abnormal, consistent with grade II (pseudonormal) relaxation. •  Left Atrium: The atrium is moderately dilated. •  Right Atrium: The atrium is mildly dilated. •  Mitral Valve: There is moderate thickening. There is moderate calcification. There is moderately reduced mobility. There is moderate regurgitation. There is mild to moderate stenosis. •  Tricuspid Valve: There is mild regurgitation. •  Pulmonary Artery: The estimated pulmonary artery systolic pressure is 65.9 mmHg. The pulmonary artery systolic pressure is mildly increased. CT pe study w abdomen pelvis w contrast    Result Date: 9/8/2023  Narrative: CT PULMONARY ANGIOGRAM OF THE CHEST AND CT ABDOMEN AND PELVIS WITH INTRAVENOUS CONTRAST INDICATION:   SOB, HR 160s; rule out PE; c/o epigastric dull abdominal pain, rule outacute intraabdominal process. COMPARISON:  None. TECHNIQUE:  CT examination of the chest, abdomen and pelvis was performed.   Thin section CT angiographic technique was used in the chest in order to evaluate for pulmonary embolus and coronal 3D MIP postprocessing was performed on the acquisition scanner. Multiplanar 2D reformatted images were created from the source data. This examination, like all CT scans performed in the [facility], was performed utilizing techniques to minimize radiation dose exposure, including the use of iterative reconstruction and automated exposure control. Radiation dose length product (DLP) for this visit:  1513 mGy-cm IV Contrast:  100 mL of iohexol (OMNIPAQUE) Enteric Contrast:  Enteric contrast was not administered. FINDINGS: CHEST PULMONARY ARTERIAL TREE:  No pulmonary embolus is seen. LUNGS: Diffuse intralobular septal thickening. Dual right upper lobe groundglass nodules, largest of which measures 8 mm. 4 mm right lower lobe solid nodule. There is no tracheal or endobronchial lesion. PLEURA: Small right greater than left pleural effusions. HEART/AORTA:  Heart is unremarkable for patient's age. No thoracic aortic aneurysm. MEDIASTINUM AND YAS: Shotty mediastinal lymph nodes. CHEST WALL AND LOWER NECK:  Unremarkable. ABDOMEN LIVER/BILIARY TREE:  Unremarkable. GALLBLADDER:  No calcified gallstones. No gallbladder wall thickening. SPLEEN: Small calcified granuloma in the spleen. PANCREAS:  Unremarkable. ADRENAL GLANDS:  Unremarkable. KIDNEYS/URETERS:  No hydronephrosis or urinary tract calculus. One or more sharply circumscribed subcentimeter renal hypodensities are present, too small to accurately characterize, and statistically most likely benign findings. According to recent literature (Radiology 2019) no further workup of these findings is recommended. STOMACH AND BOWEL: Normal course and caliber of the stomach with a small duodenal diverticulum present. No dilated loops of bowel. Mild colonic diverticulosis without evidence of acute diverticulitis. APPENDIX: A normal appendix was visualized. ABDOMINOPELVIC CAVITY: Trace free fluid adjacent to the right adnexa is nonspecific. No pneumoperitoneum. No lymphadenopathy. VESSELS:  Unremarkable for patient's age. PELVIS REPRODUCTIVE ORGANS:  Unremarkable for patient's age. URINARY BLADDER:  Unremarkable. ABDOMINAL WALL/INGUINAL REGIONS:  Unremarkable. OSSEOUS STRUCTURES:  No acute fracture or destructive osseous lesion. Impression: 1. No pulmonary embolism. 2.  Mild pulmonary edema and small bilateral pleural effusions. 3.  No acute findings in the abdomen or pelvis. 4.  Dual small groundglass nodules in the right upper lobe, larger being 8 mm. Per Fleischner Society guidelines on the management of incidentally detected pulmonary nodules, recommend follow-up chest CT in 3-6 months. The study was marked in EPIC for significant notification. Workstation performed: [id number]     XR chest 1 view portable    Result Date: 9/8/2023  Narrative: CHEST INDICATION:   increased HR. COMPARISON:  None EXAM PERFORMED/VIEWS:  XR CHEST PORTABLE FINDINGS: Cardiomediastinal silhouette appears unremarkable. Prominence of the interstitial markings. No pleural effusion, focal consolidation or pneumothorax Osseous structures appear within normal limits for patient age. Impression: Possible pulmonary vascular congestion. Correlation with symptoms and BNP is advised. Workstation performed: [id number]     MAMMO 3D TOMOSYNTHESIS SCREENING BILAT W/CAD    Result Date: 8/21/2023  Narrative: HISTORY: Patient is 63years old and is seen for screening. FILMS COMPARED: Prior imaging studies performed at [facility] on 05/19/2018, at [facility] on 07/31/2019, 08/03/2020, 08/05/2021, 08/08/2022 and at [facility] on 02/11/2017 were reviewed. MAMMOGRAM FINDINGS: The following digital mammographic views were obtained: bilateral craniocaudal, bilateral mediolateral oblique and bilateral 3D tomosynthesis.   Computer-aided detection was utilized by the radiologist in the interpretation of this examination. There are scattered fibroglandular densities.  No suspicious masses, calcifications or other abnormalities are seen. Impression: IMPRESSION: There is no mammographic evidence of malignancy. Routine follow-up mammogram in 1 year is recommended. BI-RADS Category 1: Negative WS Code: XTLOM      Review of Systems:  Review of Systems    Physical Exam:  She is overweight. Blood pressure 128/82 rate 65 and regular. Lungs are clear. Neck veins not distended. Carotids 2+. Rhythm regular. Holosystolic murmur grade 2 at the apex. I cannot hear an opening snap or diastolic murmur. Liver not enlarged. Good pulses. No edema. Good strength in all extremities. Discussion/Summary:    1. Most likely rheumatic disease with mild mitral stenosis and moderate mitral regurgitation  2. Paroxysmal atrial fibrillation and flutter    Recommendations:    1. We will get a stat pro time and put her in the Coumadin clinic  2. Low-salt diet  3. Return 3 months.       Perry Howell MD

## 2023-09-15 NOTE — TELEPHONE ENCOUNTER
Pt was in the office today for a follow up appt and was started on warfarin at the hospital. As per Dr. Huong Lopez please follow up with pt and establish her with our coumadin clinic. Pt was sent for a stat INR check    Thanks!

## 2023-09-15 NOTE — PATIENT INSTRUCTIONS
Low-Sodium Diet   AMBULATORY CARE:   A low-sodium diet  limits foods that are high in sodium (salt). You will need to follow a low-sodium diet if you have high blood pressure, kidney disease, or heart failure. You may also need to follow this diet if you have a condition that is causing your body to retain (hold) extra fluid. You may need to limit the amount of sodium you eat in a day to 1,500 to 2,000 mg. Ask your healthcare provider how much sodium you can have each day. How to use food labels to choose foods that are low in sodium:  Read food labels to find the amount of sodium they contain. The amount of sodium is listed in milligrams (mg). The % Daily Value (DV) column tells you how much of your daily needs are met by 1 serving of the food for each nutrient listed. Choose foods that have less than 5% of the DV of sodium. These foods are considered low in sodium. Foods that have 20% or more of the DV of sodium are considered high in sodium. Some food labels may also list any of the following terms that tell you about the sodium content in the food:  Sodium-free:  Less than 5 mg in each serving    Very low sodium:  35 mg of sodium or less in each serving    Low sodium:  140 mg of sodium or less in each serving    Reduced sodium: At least 25% less sodium in each serving than the regular type    Light in sodium:  50% less sodium in each serving    Unsalted or no added salt:  No extra salt is added during processing (the food may still contain sodium)       Foods to avoid:  Salty foods are high in sodium.  You should avoid the following:  Processed foods:      Mixes for cornbread, biscuits, cake, and pudding     Instant foods, such as potatoes, cereals, noodles, and rice     Packaged foods, such as bread stuffing, rice and pasta mixes, snack dip mixes, and macaroni and cheese     Canned foods, such as canned vegetables, soups, broths, sauces, and vegetable or tomato juice    Snack foods, such as salted chips, popcorn, pretzels, pork rinds, salted crackers, and salted nuts    Frozen foods, such as dinners, entrees, vegetables with sauces, and breaded meats    Sauerkraut, pickled vegetables, and other foods prepared in brine    Meats and cheeses:      Smoked or cured meat, such as corned beef, pantoja, ham, hot dogs, and sausage    Canned meats or spreads, such as potted meats, sardines, anchovies, and imitation seafood    Deli or lunch meats, such as bologna, ham, turkey, and roast beef    Processed cheese, such as American cheese and cheese spreads    Condiments, sauces, and seasonings:      Salt (¼ teaspoon of salt contains 575 mg of sodium)    Seasonings made with salt, such as garlic salt, celery salt, onion salt, and seasoned salt    Regular soy sauce, barbecue sauce, teriyaki sauce, steak sauce, Worcestershire sauce, and most flavored vinegars    Canned gravy and mixes     Regular condiments, such as mustard, ketchup, and salad dressings    Pickles and olives    Meat tenderizers and monosodium glutamate (MSG)    Foods to include:  Read the food label to find the exact amount of sodium in each serving. Bread and cereal:  Try to choose breads with less than 80 mg of sodium per serving. Bread, roll, shayy, tortilla, or unsalted crackers. Ready-to-eat cereals with less than 5% DV of sodium (examples include shredded wheat and puffed rice)    Pasta    Vegetables and fruits:      Unsalted fresh, frozen, or canned vegetables    Fresh, frozen, or canned fruits    Fruit juice    Dairy:  One serving has about 150 mg of sodium. Milk, all types    Yogurt    Hard cheese, such as cheddar, Swiss, [employer], or [employer] and other protein foods:  Some raw meats may have added sodium.      Plain meats, fish, and poultry     Eggs    Other foods:      Homemade pudding    Unsalted nuts, popcorn, or pretzels    Unsalted butter or margarine    Ways to get less sodium:  If you are used to the flavor of salt, it will take time to get used to low-sodium foods. Your healthcare provider or dietitian can help you create a plan for lowering sodium. The plan will be specific to your needs and your family's needs. You may focus on 1 or 2 changes each week, such as the following: Add spices and herbs to foods instead of salt during cooking. Use salt-free seasonings to add flavor to foods. Examples include onion powder, garlic powder, basil, izquierdo powder, paprika, and parsley. Try lemon or lime juice or vinegar to give foods a tart flavor. Use hot peppers, pepper, or cayenne pepper to add a spicy flavor. Do not keep a salt shaker at your kitchen table. This may help keep you from adding salt to food at the table. A teaspoon of salt has 2,300 mg of sodium. It may take time to get used to enjoying the natural flavor of food instead of adding salt. Talk to your healthcare provider before you use salt substitutes. Some salt substitutes have a high amount of a chemical called potassium chloride. This form of potassium needs to be avoided if you have kidney disease. Choose low-sodium foods at restaurants. Meals from restaurants are often high in sodium. Some restaurants have nutrition information on the menu that tells you the amount of sodium in their foods. If possible, ask for your food to be prepared with less, or no salt. Shop for unsalted or low-sodium foods and snacks at the grocery store. Examples include unsalted or low-sodium broths, soups, and canned vegetables. Choose fresh or frozen vegetables instead. Choose unsalted nuts or seeds or fresh fruits or vegetables as snacks. Read food labels and choose salt-free, very low-sodium, or low-sodium foods. You can also rinse canned vegetables under running water to remove extra sodium before you cook them. © Copyright Ana Lilia Hidalgo 2022 Information is for End User's use only and may not be sold, redistributed or otherwise used for commercial purposes.   The above information is an  only. It is not intended as medical advice for individual conditions or treatments. Talk to your doctor, nurse or pharmacist before following any medical regimen to see if it is safe and effective for you.

## 2023-09-21 ENCOUNTER — ANTICOAG VISIT (OUTPATIENT)
Dept: CARDIOLOGY CLINIC | Facility: CLINIC | Age: 63
End: 2023-09-21

## 2023-09-21 ENCOUNTER — APPOINTMENT (OUTPATIENT)
Dept: LAB | Facility: HOSPITAL | Age: 63
End: 2023-09-21
Payer: COMMERCIAL

## 2023-09-21 LAB
INR PPP: 1.59 (ref 0.84–1.19)
PROTHROMBIN TIME: 19.7 SECONDS (ref 11.6–14.5)

## 2023-09-21 PROCEDURE — 36415 COLL VENOUS BLD VENIPUNCTURE: CPT

## 2023-09-21 PROCEDURE — 85610 PROTHROMBIN TIME: CPT

## 2023-09-28 ENCOUNTER — LAB (OUTPATIENT)
Dept: LAB | Facility: HOSPITAL | Age: 63
End: 2023-09-28
Payer: COMMERCIAL

## 2023-09-28 ENCOUNTER — ANTICOAG VISIT (OUTPATIENT)
Dept: CARDIOLOGY CLINIC | Facility: CLINIC | Age: 63
End: 2023-09-28

## 2023-09-28 DIAGNOSIS — I48.0 PAROXYSMAL ATRIAL FIBRILLATION (HCC): ICD-10-CM

## 2023-09-28 LAB
INR PPP: 2.15 (ref 0.84–1.19)
PROTHROMBIN TIME: 24.9 SECONDS (ref 11.6–14.5)

## 2023-09-28 PROCEDURE — 36415 COLL VENOUS BLD VENIPUNCTURE: CPT

## 2023-09-28 PROCEDURE — 85610 PROTHROMBIN TIME: CPT

## 2025-03-25 ENCOUNTER — OFFICE VISIT (OUTPATIENT)
Dept: BARIATRICS | Facility: CLINIC | Age: 65
End: 2025-03-25
Payer: COMMERCIAL

## 2025-03-25 VITALS
HEART RATE: 76 BPM | SYSTOLIC BLOOD PRESSURE: 140 MMHG | OXYGEN SATURATION: 97 % | BODY MASS INDEX: 38.01 KG/M2 | WEIGHT: 250.8 LBS | HEIGHT: 68 IN | DIASTOLIC BLOOD PRESSURE: 90 MMHG

## 2025-03-25 DIAGNOSIS — E66.812 OBESITY, CLASS II, BMI 35-39.9: Primary | ICD-10-CM

## 2025-03-25 DIAGNOSIS — I48.91 ATRIAL FIBRILLATION (HCC): ICD-10-CM

## 2025-03-25 PROCEDURE — 99203 OFFICE O/P NEW LOW 30 MIN: CPT | Performed by: PHYSICAL THERAPIST

## 2025-03-25 RX ORDER — ERGOCALCIFEROL 1.25 MG/1
CAPSULE ORAL
COMMUNITY
Start: 2024-11-28

## 2025-03-25 RX ORDER — NEBIVOLOL 5 MG/1
5 TABLET ORAL DAILY
COMMUNITY

## 2025-03-25 RX ORDER — DILTIAZEM HYDROCHLORIDE 180 MG/1
1 CAPSULE, COATED, EXTENDED RELEASE ORAL DAILY
COMMUNITY
Start: 2025-02-14

## 2025-03-25 NOTE — PATIENT INSTRUCTIONS
- Discussed options of HealthyCORE-Intensive Lifestyle Intervention Program, Very Low Calorie Diet-VLCD, and Conservative Program and the role of weight loss medications.  - Patient is interested in pursuing HealthyCORE-Intensive Lifestyle Intervention Program and follow up visits with medical weight management provider.  - Explained the importance of making lifestyle changes in addition to starting any anti-obesity medications.   - Initial weight loss goal of 5-10% weight loss for improved health. Weight loss can improve patient's co-morbid conditions and/or prevent weight-related complications.  - Weight is not at goal and patient has been unable to achieve a meaningful weight loss above 5% using various programs and tools for more than 6 months  - Labs reviewed.     General Recommendations:  Nutrition:  Eat breakfast daily.  Do not skip meals.      Food log (ie.) www.myfitnesspal.com, sparkpeople.com, loseit.com, calorieking.com, etc.     Practice mindful eating.  Be sure to set aside time to eat, eat slowly, and savor your food.     Hydration:    At least 64oz of water daily.  No sugar sweetened beverages.  No juice (eat the fruit instead).     Exercise:  Studies have shown that the ideal exercise goal is somewhere between 150 to 300 minutes of moderate intensity exercise a week.  Start with exercising 10 minutes every other day and gradually increase physical activity with a goal of at least 150 minutes of moderate intensity exercise a week, divided over at least 3 days a week.  An example of this would be exercising 30 minutes a day, 5 days a week.  Resistance training can increase muscle mass and increase our resting metabolic rate.   FULL BODY resistance training is recommended 2-3 times a week.  Do not do this on consecutive days to allow for muscle recovery.     Aim for a bare minimum 5000 steps, even on days you do not exercise.     Monitoring:   Weigh yourself daily.  If this causes undue stress, then  just weigh yourself once a week.  Weigh yourself the same time of the day with the same amount of clothing on.  Preferably this should be done after waking up, before you eat, and with no clothing or minimal clothing on.     Specific Goals:  Calorie goal:  1500 soren/day (Provided with meal plan to follow)

## 2025-03-25 NOTE — ASSESSMENT & PLAN NOTE
- Will proceed with lifestyle modification.  - Consider pharmacologic intervention at 3-month follow-up visit.   - Discussed options of HealthyCORE-Intensive Lifestyle Intervention Program, Very Low Calorie Diet-VLCD, and Conservative Program and the role of weight loss medications.  - Patient is interested in pursuing HealthyCORE-Intensive Lifestyle Intervention Program and follow up visits with medical weight management provider.  - Explained the importance of making lifestyle changes in addition to starting any anti-obesity medications.   - Initial weight loss goal of 5-10% weight loss for improved health. Weight loss can improve patient's co-morbid conditions and/or prevent weight-related complications.  - Weight is not at goal and patient has been unable to achieve a meaningful weight loss above 5% using various programs and tools for more than 6 months  - Labs reviewed.     General Recommendations:  Nutrition:  Eat breakfast daily.  Do not skip meals.      Food log (ie.) [email or web address], sparkpeople.com, loseit.com, calorieking.com, etc.     Practice mindful eating.  Be sure to set aside time to eat, eat slowly, and savor your food.     Hydration:    At least 64oz of water daily.  No sugar sweetened beverages.  No juice (eat the fruit instead).     Exercise:  Studies have shown that the ideal exercise goal is somewhere between 150 to 300 minutes of moderate intensity exercise a week.  Start with exercising 10 minutes every other day and gradually increase physical activity with a goal of at least 150 minutes of moderate intensity exercise a week, divided over at least 3 days a week.  An example of this would be exercising 30 minutes a day, 5 days a week.  Resistance training can increase muscle mass and increase our resting metabolic rate.   FULL BODY resistance training is recommended 2-3 times a week.  Do not do this on consecutive days to allow for muscle recovery.     Aim for a bare minimum 5000  steps, even on days you do not exercise.     Monitoring:   Weigh yourself daily.  If this causes undue stress, then just weigh yourself once a week.  Weigh yourself the same time of the day with the same amount of clothing on.  Preferably this should be done after waking up, before you eat, and with no clothing or minimal clothing on.     Specific Goals:  Calorie goal:  1500 soren/day (Provided with meal plan to follow)

## 2025-03-25 NOTE — PROGRESS NOTES
Assessment/Plan:    Obesity, Class II, BMI 35-39.9  - Will proceed with lifestyle modification.  - Consider pharmacologic intervention at 3-month follow-up visit.   - Discussed options of HealthyCORE-Intensive Lifestyle Intervention Program, Very Low Calorie Diet-VLCD, and Conservative Program and the role of weight loss medications.  - Patient is interested in pursuing HealthyCORE-Intensive Lifestyle Intervention Program and follow up visits with medical weight management provider.  - Explained the importance of making lifestyle changes in addition to starting any anti-obesity medications.   - Initial weight loss goal of 5-10% weight loss for improved health. Weight loss can improve patient's co-morbid conditions and/or prevent weight-related complications.  - Weight is not at goal and patient has been unable to achieve a meaningful weight loss above 5% using various programs and tools for more than 6 months  - Labs reviewed.     General Recommendations:  Nutrition:  Eat breakfast daily.  Do not skip meals.      Food log (ie.) www.[email or web address].[email or web address], sparkpeople.com, loseit.com, calorieking.com, etc.     Practice mindful eating.  Be sure to set aside time to eat, eat slowly, and savor your food.     Hydration:    At least 64oz of water daily.  No sugar sweetened beverages.  No juice (eat the fruit instead).     Exercise:  Studies have shown that the ideal exercise goal is somewhere between 150 to 300 minutes of moderate intensity exercise a week.  Start with exercising 10 minutes every other day and gradually increase physical activity with a goal of at least 150 minutes of moderate intensity exercise a week, divided over at least 3 days a week.  An example of this would be exercising 30 minutes a day, 5 days a week.  Resistance training can increase muscle mass and increase our resting metabolic rate.   FULL BODY resistance training is recommended 2-3 times a week.  Do not do this on consecutive days to allow for  muscle recovery.     Aim for a bare minimum 5000 steps, even on days you do not exercise.     Monitoring:   Weigh yourself daily.  If this causes undue stress, then just weigh yourself once a week.  Weigh yourself the same time of the day with the same amount of clothing on.  Preferably this should be done after waking up, before you eat, and with no clothing or minimal clothing on.     Specific Goals:  Calorie goal:  1500 soren/day (Provided with meal plan to follow)         Chapo Pop was seen today for consult.    Diagnoses and all orders for this visit:    Obesity, Class II, BMI 35-39.9    Atrial fibrillation (HCC)           Total time spent reviewing chart, interviewing patient, examining patient, discussing plan, answering all questions, and documentin min, with >50% face-to-face time spent counseling patient on nonsurgical interventions for the treatment of excess weight. Discussed in detail nonsurgical options including intensive lifestyle intervention program, very low-calorie diet program and conservative program.  Discussed the role of weight loss medications.  Counseled patient on diet behavior and exercise modification for weight loss.    Follow up in approximately 3 months with Non-Surgical Physician/Advanced Practitioner.    Subjective:   Chief Complaint   Patient presents with    Consult     Pt here today for MWM consult        Patient ID: Chapo Pop  is a 64 y.o. female with excess weight/obesity here to pursue weight management. Patient has tried multiple diets and other weight loss programs. No previous use of medications or exercise.   Previous notes and records have been reviewed.    Past Medical History:   Diagnosis Date    INGRID (acute kidney injury) (HCC) 2023    Hypertension      Past Surgical History:   Procedure Laterality Date     SECTION         HPI:  Wt Readings from Last 20 Encounters:   25 114 kg (250 lb 12.8 oz)   09/15/23 113 kg (249 lb)    09/10/23 115 kg (254 lb)       Patient presents today to medical weight management office for consult.      Starting MWM weight: 250 lbs (3/25/2025)   Starting BMI: 38.13 (3/25/2025)  Goal weight: 220 lbs       Obesity/Excess Weight:  Severity: Severe  Onset:  following birth of second child     Modifiers: Diet and Exercise, Commercial Weight Loss Programs-ie. Weight Watchers, Blanca Dariel, Nutrisystem, etc., and Over the Counter Weight Loss Supplements  Contributing factors: Poor Food Choices, Insufficient Physical Activity, Stress/Emotional Eating, Pregnancy, Lack of knowledge of appropriate lifestyle changes, and Insufficient time to make appropriate lifestyle changes  Associated symptoms: comorbid conditions, fatigue, increased joint pain, body image issues, decreased self esteem, increased shortness of breath, decreased mobility, and clothes do not fit      Pertinent PMHx: A-Fib      Diet recall:  B: sandwich and tea  L: usually skips (not hungry)  D: protein, carb; sometimes veggies but limited due to warfarin   S: sometimes chips   Take out frequency: never    Hydration: water, juice  Alcohol: none  Smoking: none  Exercise: none  Occupation: teacher  Sleep: 10 hrs  STOP ban/8    Colonoscopy: UTD  Mammogram: UTD        The following portions of the patient's history were reviewed and updated as appropriate: allergies, current medications, past family history, past medical history, past social history, past surgical history, and problem list.    History reviewed. No pertinent family history.     Review of Systems   Constitutional:  Negative for chills and fever.   HENT:  Negative for ear pain and sore throat.    Eyes:  Negative for pain and visual disturbance.   Respiratory:  Negative for cough and shortness of breath.    Cardiovascular:  Negative for chest pain and palpitations.   Gastrointestinal:  Negative for abdominal pain and vomiting.   Genitourinary:  Negative for dysuria and hematuria.  "  Musculoskeletal:  Negative for arthralgias and back pain.   Skin:  Negative for color change and rash.   Neurological:  Negative for seizures and syncope.   All other systems reviewed and are negative.      Objective:  /90 (BP Location: Left arm, Patient Position: Sitting, Cuff Size: Large)   Pulse 76   Ht 5' 8\" (1.727 m)   Wt 114 kg (250 lb 12.8 oz)   SpO2 97%   BMI 38.13 kg/m²     Physical Exam  Constitutional:       General: She is not in acute distress.     Appearance: Normal appearance. She is obese. She is not ill-appearing, toxic-appearing or diaphoretic.   HENT:      Head: Normocephalic and atraumatic.   Pulmonary:      Effort: Pulmonary effort is normal.   Musculoskeletal:         General: Normal range of motion.      Cervical back: Normal range of motion.   Skin:     General: Skin is warm.   Neurological:      Mental Status: She is alert and oriented to person, place, and time.   Psychiatric:         Mood and Affect: Mood normal.         Behavior: Behavior normal.              Labs and Imaging  Recent labs and imaging have been personally reviewed.  Lab Results   Component Value Date    WBC 6.16 09/09/2023    HGB 11.5 09/09/2023    HCT 36.6 09/09/2023    MCV 92 09/09/2023     09/09/2023     Lab Results   Component Value Date    SODIUM 140 09/09/2023    K 3.5 09/09/2023     09/09/2023    CO2 28 09/09/2023    AGAP 6 09/09/2023    BUN 14 09/09/2023    CREATININE 1.13 09/09/2023    GLUC 93 09/09/2023    CALCIUM 8.5 09/09/2023    AST 21 09/09/2023    ALT 38 09/09/2023    ALKPHOS 89 09/09/2023    TP 6.7 09/09/2023    TBILI 0.84 09/09/2023    EGFR 52 09/09/2023     No results found for: \"HGBA1C\"  Lab Results   Component Value Date    EPF4DJHILDQU 1.550 09/08/2023     No results found for: \"CHOLESTEROL\"  No results found for: \"HDL\"  No results found for: \"TRIG\"  No results found for: \"LDLCALC\"        Weight Management  Jose Rafael Jiang PA-C    "

## 2025-04-17 ENCOUNTER — OFFICE VISIT (OUTPATIENT)
Dept: OBGYN CLINIC | Facility: CLINIC | Age: 65
End: 2025-04-17
Payer: COMMERCIAL

## 2025-04-17 VITALS
BODY MASS INDEX: 38.8 KG/M2 | HEIGHT: 68 IN | DIASTOLIC BLOOD PRESSURE: 80 MMHG | SYSTOLIC BLOOD PRESSURE: 128 MMHG | WEIGHT: 256 LBS

## 2025-04-17 DIAGNOSIS — Z01.419 ENCOUNTER FOR ANNUAL ROUTINE GYNECOLOGICAL EXAMINATION: ICD-10-CM

## 2025-04-17 DIAGNOSIS — Z12.31 BREAST CANCER SCREENING BY MAMMOGRAM: ICD-10-CM

## 2025-04-17 DIAGNOSIS — Z11.3 SCREEN FOR STD (SEXUALLY TRANSMITTED DISEASE): ICD-10-CM

## 2025-04-17 DIAGNOSIS — Z12.4 ENCOUNTER FOR SCREENING FOR CERVICAL CANCER: Primary | ICD-10-CM

## 2025-04-17 DIAGNOSIS — Z12.11 ENCOUNTER FOR SCREENING FOR MALIGNANT NEOPLASM OF COLON: ICD-10-CM

## 2025-04-17 PROBLEM — I10 PRIMARY HYPERTENSION: Status: ACTIVE | Noted: 2024-07-12

## 2025-04-17 PROCEDURE — S0610 ANNUAL GYNECOLOGICAL EXAMINA: HCPCS

## 2025-04-17 PROCEDURE — G0145 SCR C/V CYTO,THINLAYER,RESCR: HCPCS

## 2025-04-17 PROCEDURE — 87491 CHLMYD TRACH DNA AMP PROBE: CPT

## 2025-04-17 PROCEDURE — G0476 HPV COMBO ASSAY CA SCREEN: HCPCS

## 2025-04-17 PROCEDURE — 87591 N.GONORRHOEAE DNA AMP PROB: CPT

## 2025-04-17 NOTE — PROGRESS NOTES
Name: Carolyne Pop      : 1960      MRN: [id number]  Encounter Provider: Yolande Lopez PA-C  Encounter Date: 2025   Encounter department: [facility]  :  Assessment & Plan  Encounter for screening for cervical cancer    Orders:    Liquid-based pap, screening    Encounter for screening for malignant neoplasm of colon    We do not have her colonoscopy records but the patient thinks she is due either this year or next year  Referral to GI    Encounter for annual routine gynecological examination     Calcium 1200-1500mg (in divided doses-max 600 mg at one time) + 600-1000 IU Vit D daily.   Exercise 150-300 minutes per week minimum including weight bearing exercises.   Pap with high risk HPV Q 5 years, if normal.    Call your insurance company to verify coverage prior to completing any ordered tests.   Annual mammogram ordered and monthly breast self exam recommended.      Kegels 20 times twice daily.   Silicone based lubricant with sex. (Use water based lubricant with condoms or sexual toys.)  Vaginal moisturizers twice weekly as needed.   Return to office in one year or sooner, if needed.          History of Present Illness   HPI  Carolyne Pop is a 64 y.o. female who presents for an annual exam    No obstetric history on file.  Menopausal since ~10 years ago  Vasomotor symptoms No  Sexually active Yes Monogamous   HRT No  History of abnormal pap: No  Last pap Not on file ,Next pap: today  Self breast exam yes  Mammogram 2024 BI-RADS 1 lifetime Tyrer-Cuzick 6.08% next Mammogram ordered  Colon Cancer screening: Not on file , Patient thinks she is due at the end of this year or next year      Hereditary Cancer Screening  FH Breast/Ovarian cancer: none  FH Uterine cancer: none  FH Colon cancer: none  Cancer-related family history is not on file.       Review of Systems   Constitutional:  Negative for chills, fatigue, fever and unexpected  "weight change.   Gastrointestinal:  Negative for abdominal pain, anal bleeding, blood in stool, constipation, diarrhea, nausea and vomiting.   Genitourinary:  Negative for difficulty urinating, dyspareunia, dysuria, frequency, hematuria, pelvic pain, urgency, vaginal bleeding, vaginal discharge and vaginal pain.   Psychiatric/Behavioral:  Negative for dysphoric mood. The patient is not nervous/anxious.      Current Outpatient Medications on File Prior to Visit   Medication Sig Dispense Refill    diltiazem (CARDIZEM CD) 180 mg 24 hr capsule Take 1 capsule by mouth in the morning      folic acid (FOLVITE) 1 mg tablet Take 1 mg by mouth daily      nebivolol (BYSTOLIC) 5 mg tablet Take 5 mg by mouth daily      Vitamin D, Ergocalciferol, 32279 units CAPS       warfarin (COUMADIN) 5 mg tablet Take 1 tablet (5 mg total) by mouth daily 30 tablet 0     No current facility-administered medications on file prior to visit.      Social History     Tobacco Use    Smoking status: Never    Smokeless tobacco: Never   Substance and Sexual Activity    Alcohol use: Never    Drug use: Never    Sexual activity: Not on file        Objective   /80 (BP Location: Left arm, Patient Position: Sitting, Cuff Size: Large)   Ht 5' 8\" (1.727 m)   Wt 116 kg (256 lb)   BMI 38.92 kg/m²      Physical Exam  Vitals and nursing note reviewed.   Constitutional:       General: She is not in acute distress.     Appearance: She is well-developed.   HENT:      Head: Normocephalic and atraumatic.   Eyes:      Extraocular Movements: Extraocular movements intact.   Pulmonary:      Effort: Pulmonary effort is normal.   Chest:   Breasts:     Right: No swelling, bleeding, inverted nipple, mass, nipple discharge, skin change or tenderness.      Left: No swelling, bleeding, inverted nipple, mass, nipple discharge, skin change or tenderness.   Abdominal:      Palpations: Abdomen is soft.      Tenderness: There is no abdominal tenderness.      Hernia: There is " no hernia in the left inguinal area or right inguinal area.   Genitourinary:     General: Normal vulva.      Exam position: Lithotomy position.      Pubic Area: No rash.       Labia:         Right: No rash, tenderness or lesion.         Left: No rash, tenderness or lesion.       Urethra: No urethral pain or urethral swelling.      Vagina: No vaginal discharge, erythema, tenderness, bleeding or lesions.      Cervix: No cervical motion tenderness, discharge, friability, lesion, erythema or cervical bleeding.      Uterus: Not enlarged and not tender.       Adnexa:         Right: No mass, tenderness or fullness.          Left: No mass, tenderness or fullness.     Lymphadenopathy:      Upper Body:      Right upper body: No supraclavicular, axillary or pectoral adenopathy.      Left upper body: No supraclavicular, axillary or pectoral adenopathy.      Lower Body: No right inguinal adenopathy. No left inguinal adenopathy.   Skin:     General: Skin is warm and dry.   Neurological:      Mental Status: She is alert.   Psychiatric:         Mood and Affect: Mood normal.         Behavior: Behavior normal.         Yolande Lopez PA-C

## 2025-04-18 LAB
HPV HR 12 DNA CVX QL NAA+PROBE: NEGATIVE
HPV16 DNA CVX QL NAA+PROBE: NEGATIVE
HPV18 DNA CVX QL NAA+PROBE: NEGATIVE

## 2025-04-19 LAB
C TRACH DNA SPEC QL NAA+PROBE: NEGATIVE
N GONORRHOEA DNA SPEC QL NAA+PROBE: NEGATIVE

## 2025-04-22 LAB
LAB AP GYN PRIMARY INTERPRETATION: NORMAL
Lab: NORMAL

## 2025-04-23 ENCOUNTER — RESULTS FOLLOW-UP (OUTPATIENT)
Age: 65
End: 2025-04-23

## 2025-04-24 NOTE — TELEPHONE ENCOUNTER
----- Message from Yolande Lopez PA-C sent at 4/23/2025  7:35 AM EDT -----  Please let the patient know that her pap smear/HPV test and GC/chlamydia test were all normal

## 2025-06-12 ENCOUNTER — OFFICE VISIT (OUTPATIENT)
Dept: FAMILY MEDICINE CLINIC | Facility: CLINIC | Age: 65
End: 2025-06-12
Payer: COMMERCIAL

## 2025-06-12 VITALS
HEIGHT: 68 IN | OXYGEN SATURATION: 99 % | HEART RATE: 67 BPM | DIASTOLIC BLOOD PRESSURE: 82 MMHG | SYSTOLIC BLOOD PRESSURE: 122 MMHG | WEIGHT: 259 LBS | BODY MASS INDEX: 39.25 KG/M2

## 2025-06-12 DIAGNOSIS — I11.0 HYPERTENSIVE HEART DISEASE WITH HEART FAILURE (HCC): ICD-10-CM

## 2025-06-12 DIAGNOSIS — I10 PRIMARY HYPERTENSION: ICD-10-CM

## 2025-06-12 DIAGNOSIS — I48.19 PERSISTENT ATRIAL FIBRILLATION (HCC): Primary | ICD-10-CM

## 2025-06-12 DIAGNOSIS — E66.812 OBESITY, CLASS II, BMI 35-39.9: ICD-10-CM

## 2025-06-12 DIAGNOSIS — R06.83 SNORING: ICD-10-CM

## 2025-06-12 DIAGNOSIS — E55.9 VITAMIN D DEFICIENCY: ICD-10-CM

## 2025-06-12 DIAGNOSIS — R91.8 PULMONARY NODULES: ICD-10-CM

## 2025-06-12 PROCEDURE — 99204 OFFICE O/P NEW MOD 45 MIN: CPT

## 2025-06-12 RX ORDER — CLINDAMYCIN PHOSPHATE 10 UG/ML
LOTION TOPICAL
COMMUNITY
Start: 2025-06-10

## 2025-06-12 RX ORDER — TIRZEPATIDE 2.5 MG/.5ML
2.5 INJECTION, SOLUTION SUBCUTANEOUS WEEKLY
Qty: 2 ML | Refills: 0 | Status: SHIPPED | OUTPATIENT
Start: 2025-06-12 | End: 2025-07-10

## 2025-06-12 RX ORDER — TRETINOIN 0.25 MG/G
CREAM TOPICAL
COMMUNITY
Start: 2025-06-10

## 2025-06-12 NOTE — ASSESSMENT & PLAN NOTE
Reviewed previous ct, will repeat recommended imaging and call with results   Orders:  •  CT chest wo contrast; Future

## 2025-06-12 NOTE — ASSESSMENT & PLAN NOTE
Following with cardiology, Rate controlled, on warfarin BP perfect, will test for sleep apnea and work on weight loss  Orders:  •  tirzepatide (Zepbound) 2.5 mg/0.5 mL auto-injector; Inject 0.5 mL (2.5 mg total) under the skin once a week for 28 days

## 2025-06-12 NOTE — ASSESSMENT & PLAN NOTE
BP perfect continue current medications and will work on weight loss  Orders:  •  tirzepatide (Zepbound) 2.5 mg/0.5 mL auto-injector; Inject 0.5 mL (2.5 mg total) under the skin once a week for 28 days

## 2025-06-12 NOTE — PROGRESS NOTES
Name: Carolyne Pop      : 1960      MRN: [id number]  Encounter Provider: LOUIS Cuba  Encounter Date: 2025   Encounter department: [facility] [address]  :  Assessment & Plan  Persistent atrial fibrillation (HCC)  Following with cardiology, Rate controlled, on warfarin BP perfect, will test for sleep apnea and work on weight loss  Orders:  •  tirzepatide (Zepbound) 2.5 mg/0.5 mL auto-injector; Inject 0.5 mL (2.5 mg total) under the skin once a week for 28 days    Primary hypertension  BP perfect continue current medications and will work on weight loss  Orders:  •  tirzepatide (Zepbound) 2.5 mg/0.5 mL auto-injector; Inject 0.5 mL (2.5 mg total) under the skin once a week for 28 days    Obesity, Class II, BMI 35-39.9  Prior Authorization Clinical Questions for Weight Management Pharmacotherapy    1. Does the patient have a contrainidcation to medication prescribed for weight management?: No  2. Does the patient have a diagnosis of obesity, confirmed by a BMI greater than or equal to 30 kg/m^2?: Yes  3. Does the patient have a BMI of greater than or equal to 27 kg/m^2 with at least one weight-related comorbidity/risk factor/complication (e.g. diabetes, dyslipidemia, coronary artery disease)?: Yes  4. Weight-related co-morbidities/risk factors: cardiovascular disease  5. WEGOVY CVA Indication: Does patient have established documented cardiovascular disease (history of a prior heart attack (myocardial infarction), stroke, or symptomatic peripheral arterial disease (PAD)?: N/A  6. ZEPBOUND SCOTT Indication: Does patient have documented SCOTT diagnosed via sleep study (insurance will require copy of sleep study results for approval)?: N/A  7. Has the patient been on a weight loss regimen of low-calorie diet, increased physical activity, and lifestyle modifications for a minimum of 6 months?: Yes  8. Has the patient completed a comprehensive weight loss  program (ie, Weight Watchers, Noom, Bariatrics, other naif on phone)? If so, what?: Yes   -- Q8 Further Explanation: weight watchers   9. Does the patient have a history of type 2 diabetes?: No  10. Has the member tried and failed other weight loss medication within the past 12 months?: No  11. Will the member use requested medication in combination with another GLP agonist or weight loss drug?: No  12. Is the medication a controlled substance?: No  For renewals: Has the patient had a positive outcome with current weight management medication (i.e., change in body weight of at least 4-5% after 12-16 weeks on maximally tolerated dose)?: Yes     Baseline weight (in pounds): 259 lbs  Current weight (in pounds): 259 lbs  Weight loss percentage: 0%     No family or personal history of thyroid cancer, no contradiction to use of zepbound, continue current dietary changes and exercise regimen. Do recommend Mediterranean diet and 2.5 hours of exercise weekly, have fasting lab work and follow up in 1 month for physical. Call with questions or concerns.     Orders:  •  CBC and differential; Future  •  Comprehensive metabolic panel; Future  •  Lipid panel; Future  •  Hemoglobin A1C; Future  •  TSH, 3rd generation with Free T4 reflex; Future  •  Ambulatory Referral to Sleep Medicine; Future  •  tirzepatide (Zepbound) 2.5 mg/0.5 mL auto-injector; Inject 0.5 mL (2.5 mg total) under the skin once a week for 28 days    Vitamin D deficiency  Vitamin D is low, will send weekly prescription and also suggest nightly D3 4000 u or more    Orders:  •  Vitamin D 25 hydroxy; Future    Hypertensive heart disease with heart failure (HCC)  Wt Readings from Last 3 Encounters:   06/12/25 117 kg (259 lb)   04/17/25 116 kg (256 lb)   03/25/25 114 kg (250 lb 12.8 oz)     Weight is up, appears euvolemic, will work on weight loss and continue to monitor. Continue to follow with cardiology          Orders:  •  tirzepatide (Zepbound) 2.5 mg/0.5 mL  "auto-injector; Inject 0.5 mL (2.5 mg total) under the skin once a week for 28 days    Snoring  Discussed relationship with snoring and sleep apnea, will do testing and call with results   Orders:  •  Ambulatory Referral to Sleep Medicine; Future    Pulmonary nodules  Reviewed previous ct, will repeat recommended imaging and call with results   Orders:  •  CT chest wo contrast; Future          Depression Screening and Follow-up Plan: Patient was screened for depression during today's encounter. They screened negative with a PHQ-2 score of 0.        History of Present Illness   Aaron Frias is here to establish, she is interested in weight loss, she has been dieting and walking and is only gaining weight. 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      Review of Systems    Objective   /82   Pulse 67   Ht 5' 8\" (1.727 m)   Wt 117 kg (259 lb)   SpO2 99%   BMI 39.38 kg/m²      Physical Exam      "

## 2025-06-12 NOTE — ASSESSMENT & PLAN NOTE
Prior Authorization Clinical Questions for Weight Management Pharmacotherapy    1. Does the patient have a contrainidcation to medication prescribed for weight management?: No  2. Does the patient have a diagnosis of obesity, confirmed by a BMI greater than or equal to 30 kg/m^2?: Yes  3. Does the patient have a BMI of greater than or equal to 27 kg/m^2 with at least one weight-related comorbidity/risk factor/complication (e.g. diabetes, dyslipidemia, coronary artery disease)?: Yes  4. Weight-related co-morbidities/risk factors: cardiovascular disease  5. WEGOVY CVA Indication: Does patient have established documented cardiovascular disease (history of a prior heart attack (myocardial infarction), stroke, or symptomatic peripheral arterial disease (PAD)?: N/A  6. ZEPBOUND SCOTT Indication: Does patient have documented SCOTT diagnosed via sleep study (insurance will require copy of sleep study results for approval)?: N/A  7. Has the patient been on a weight loss regimen of low-calorie diet, increased physical activity, and lifestyle modifications for a minimum of 6 months?: Yes  8. Has the patient completed a comprehensive weight loss program (ie, Weight Watchers, Noom, Bariatrics, other naif on phone)? If so, what?: Yes   -- Q8 Further Explanation: weight watchers   9. Does the patient have a history of type 2 diabetes?: No  10. Has the member tried and failed other weight loss medication within the past 12 months?: No  11. Will the member use requested medication in combination with another GLP agonist or weight loss drug?: No  12. Is the medication a controlled substance?: No  For renewals: Has the patient had a positive outcome with current weight management medication (i.e., change in body weight of at least 4-5% after 12-16 weeks on maximally tolerated dose)?: Yes     Baseline weight (in pounds): 259 lbs  Current weight (in pounds): 259 lbs  Weight loss percentage: 0%     No family or personal history of thyroid  cancer, no contradiction to use of zepbound, continue current dietary changes and exercise regimen. Do recommend Mediterranean diet and 2.5 hours of exercise weekly, have fasting lab work and follow up in 1 month for physical. Call with questions or concerns.     Orders:  •  CBC and differential; Future  •  Comprehensive metabolic panel; Future  •  Lipid panel; Future  •  Hemoglobin A1C; Future  •  TSH, 3rd generation with Free T4 reflex; Future  •  Ambulatory Referral to Sleep Medicine; Future  •  tirzepatide (Zepbound) 2.5 mg/0.5 mL auto-injector; Inject 0.5 mL (2.5 mg total) under the skin once a week for 28 days

## 2025-06-12 NOTE — ASSESSMENT & PLAN NOTE
Wt Readings from Last 3 Encounters:   06/12/25 117 kg (259 lb)   04/17/25 116 kg (256 lb)   03/25/25 114 kg (250 lb 12.8 oz)     Weight is up, appears euvolemic, will work on weight loss and continue to monitor. Continue to follow with cardiology          Orders:  •  tirzepatide (Zepbound) 2.5 mg/0.5 mL auto-injector; Inject 0.5 mL (2.5 mg total) under the skin once a week for 28 days

## 2025-06-13 ENCOUNTER — TELEPHONE (OUTPATIENT)
Dept: FAMILY MEDICINE CLINIC | Facility: CLINIC | Age: 65
End: 2025-06-13

## 2025-06-13 ENCOUNTER — TRANSCRIBE ORDERS (OUTPATIENT)
Dept: SLEEP CENTER | Facility: CLINIC | Age: 65
End: 2025-06-13

## 2025-06-13 ENCOUNTER — RESULTS FOLLOW-UP (OUTPATIENT)
Dept: FAMILY MEDICINE CLINIC | Facility: CLINIC | Age: 65
End: 2025-06-13

## 2025-06-13 DIAGNOSIS — E66.812 OBESITY, CLASS II, BMI 35-39.9: Primary | ICD-10-CM

## 2025-06-13 DIAGNOSIS — R06.83 SNORING: ICD-10-CM

## 2025-06-13 LAB
25(OH)D3+25(OH)D2 SERPL-MCNC: 45.5 NG/ML (ref 30–100)
ALBUMIN SERPL-MCNC: 3.6 G/DL (ref 3.9–4.9)
ALP SERPL-CCNC: 122 IU/L (ref 44–121)
ALT SERPL-CCNC: 12 IU/L (ref 0–32)
AST SERPL-CCNC: 17 IU/L (ref 0–40)
BASOPHILS # BLD AUTO: 0 X10E3/UL (ref 0–0.2)
BASOPHILS NFR BLD AUTO: 0 %
BILIRUB SERPL-MCNC: 0.5 MG/DL (ref 0–1.2)
BUN SERPL-MCNC: 16 MG/DL (ref 8–27)
BUN/CREAT SERPL: 15 (ref 12–28)
CALCIUM SERPL-MCNC: 8.9 MG/DL (ref 8.7–10.3)
CHLORIDE SERPL-SCNC: 103 MMOL/L (ref 96–106)
CHOLEST SERPL-MCNC: 173 MG/DL (ref 100–199)
CO2 SERPL-SCNC: 23 MMOL/L (ref 20–29)
CREAT SERPL-MCNC: 1.05 MG/DL (ref 0.57–1)
EGFR: 60 ML/MIN/1.73
EOSINOPHIL # BLD AUTO: 0.1 X10E3/UL (ref 0–0.4)
EOSINOPHIL NFR BLD AUTO: 2 %
ERYTHROCYTE [DISTWIDTH] IN BLOOD BY AUTOMATED COUNT: 14.3 % (ref 11.7–15.4)
GLOBULIN SER-MCNC: 3.1 G/DL (ref 1.5–4.5)
GLUCOSE SERPL-MCNC: 87 MG/DL (ref 70–99)
HBA1C MFR BLD: 6 % (ref 4.8–5.6)
HCT VFR BLD AUTO: 40.1 % (ref 34–46.6)
HDLC SERPL-MCNC: 51 MG/DL
HGB BLD-MCNC: 12.9 G/DL (ref 11.1–15.9)
IMM GRANULOCYTES # BLD: 0 X10E3/UL (ref 0–0.1)
IMM GRANULOCYTES NFR BLD: 0 %
LDLC SERPL CALC-MCNC: 107 MG/DL (ref 0–99)
LYMPHOCYTES # BLD AUTO: 2.3 X10E3/UL (ref 0.7–3.1)
LYMPHOCYTES NFR BLD AUTO: 48 %
MCH RBC QN AUTO: 29.7 PG (ref 26.6–33)
MCHC RBC AUTO-ENTMCNC: 32.2 G/DL (ref 31.5–35.7)
MCV RBC AUTO: 92 FL (ref 79–97)
MONOCYTES # BLD AUTO: 0.4 X10E3/UL (ref 0.1–0.9)
MONOCYTES NFR BLD AUTO: 9 %
NEUTROPHILS # BLD AUTO: 2 X10E3/UL (ref 1.4–7)
NEUTROPHILS NFR BLD AUTO: 41 %
PLATELET # BLD AUTO: 224 X10E3/UL (ref 150–450)
POTASSIUM SERPL-SCNC: 4.4 MMOL/L (ref 3.5–5.2)
PROT SERPL-MCNC: 6.7 G/DL (ref 6–8.5)
RBC # BLD AUTO: 4.35 X10E6/UL (ref 3.77–5.28)
SL AMB VLDL CHOLESTEROL CALC: 15 MG/DL (ref 5–40)
SODIUM SERPL-SCNC: 141 MMOL/L (ref 134–144)
TRIGL SERPL-MCNC: 83 MG/DL (ref 0–149)
TSH SERPL DL<=0.005 MIU/L-ACNC: 1.47 UIU/ML (ref 0.45–4.5)
WBC # BLD AUTO: 4.8 X10E3/UL (ref 3.4–10.8)

## 2025-06-13 NOTE — TELEPHONE ENCOUNTER
The prior authorization request for the CT CHEST WO CONTRAST study has not been approved. The insurance is out-of-network with [facility]. Patient will need to contact the insurance company to verify which facility she is able to go too.    Appointment is scheduled for 06/19/2025.    Please notify your patient and contact central scheduling by calling [phone] to cancel the appointment and cancel the order in Epic.

## 2025-06-16 ENCOUNTER — TELEPHONE (OUTPATIENT)
Dept: FAMILY MEDICINE CLINIC | Facility: CLINIC | Age: 65
End: 2025-06-16

## 2025-06-16 NOTE — TELEPHONE ENCOUNTER
They received an approval on the CT scan. called the benefits department back to verify if there are out-of-network benefits. Per the representative, there are out-of-network benefits.   The patient is able to come too [facility]. The appointment has not yet been canceled.

## 2025-06-19 ENCOUNTER — APPOINTMENT (OUTPATIENT)
Dept: RADIOLOGY | Facility: CLINIC | Age: 65
End: 2025-06-19
Payer: COMMERCIAL

## 2025-06-19 ENCOUNTER — HOSPITAL ENCOUNTER (OUTPATIENT)
Dept: CT IMAGING | Facility: CLINIC | Age: 65
Discharge: HOME | End: 2025-06-19
Payer: COMMERCIAL

## 2025-06-19 DIAGNOSIS — R91.8 PULMONARY NODULES: ICD-10-CM

## 2025-06-19 PROCEDURE — 71250 CT THORAX DX C-: CPT

## 2025-06-20 ENCOUNTER — TELEPHONE (OUTPATIENT)
Age: 65
End: 2025-06-20

## 2025-06-20 NOTE — TELEPHONE ENCOUNTER
Patient called to follow up on recent weight loss medication discussed with provider.    She is seeking specification verification of something, but did not want to provide additional information so this PEP could possibly assist.    Patient is specifically requesting return call from provider.

## 2025-06-25 ENCOUNTER — OFFICE VISIT (OUTPATIENT)
Dept: BARIATRICS | Facility: CLINIC | Age: 65
End: 2025-06-25

## 2025-06-25 VITALS
WEIGHT: 251 LBS | DIASTOLIC BLOOD PRESSURE: 90 MMHG | HEIGHT: 68 IN | HEART RATE: 77 BPM | OXYGEN SATURATION: 98 % | BODY MASS INDEX: 38.04 KG/M2 | SYSTOLIC BLOOD PRESSURE: 130 MMHG

## 2025-06-25 DIAGNOSIS — E66.812 OBESITY, CLASS II, BMI 35-39.9: Primary | ICD-10-CM

## 2025-06-25 DIAGNOSIS — I11.0 HYPERTENSIVE HEART DISEASE WITH HEART FAILURE (HCC): ICD-10-CM

## 2025-06-25 DIAGNOSIS — I10 PRIMARY HYPERTENSION: ICD-10-CM

## 2025-06-25 NOTE — ASSESSMENT & PLAN NOTE
- Zepbound prescribed by PCP. Awaiting PA.   - Recommend meeting with Dietician.   - Discussed options of HealthyCORE-Intensive Lifestyle Intervention Program, Very Low Calorie Diet-VLCD, and Conservative Program and the role of weight loss medications.  - Patient is interested in pursuing Conservative Program and follow up visits with medical weight management provider.  - Explained the importance of making lifestyle changes in addition to starting any anti-obesity medications.   - Initial weight loss goal of 5-10% weight loss for improved health. Weight loss can improve patient's co-morbid conditions and/or prevent weight-related complications.  - Weight is not at goal and patient has been unable to achieve a meaningful weight loss above 5% using various programs and tools for more than 6 months  - Labs reviewed.     General Recommendations:  Nutrition:  Eat breakfast daily.  Do not skip meals.      Food log (ie.) www.Nekst.com, sparkpeople.com, loseit.com, HaulerDeals.com, etc.     Practice mindful eating.  Be sure to set aside time to eat, eat slowly, and savor your food.     Hydration:    At least 64oz of water daily.  No sugar sweetened beverages.  No juice (eat the fruit instead).     Exercise:  Studies have shown that the ideal exercise goal is somewhere between 150 to 300 minutes of moderate intensity exercise a week.  Start with exercising 10 minutes every other day and gradually increase physical activity with a goal of at least 150 minutes of moderate intensity exercise a week, divided over at least 3 days a week.  An example of this would be exercising 30 minutes a day, 5 days a week.  Resistance training can increase muscle mass and increase our resting metabolic rate.   FULL BODY resistance training is recommended 2-3 times a week.  Do not do this on consecutive days to allow for muscle recovery.     Aim for a bare minimum 5000 steps, even on days you do not exercise.     Monitoring:   Weigh  yourself daily.  If this causes undue stress, then just weigh yourself once a week.  Weigh yourself the same time of the day with the same amount of clothing on.  Preferably this should be done after waking up, before you eat, and with no clothing or minimal clothing on.     Specific Goals:  Calorie goal:  9319-5772 soren/day (Provided with meal plan to follow)

## 2025-06-25 NOTE — PROGRESS NOTES
Assessment/Plan:     Obesity, Class II, BMI 35-39.9  - Zepbound prescribed by PCP. Awaiting PA.   - Recommend meeting with Dietician.   - Discussed options of HealthyCORE-Intensive Lifestyle Intervention Program, Very Low Calorie Diet-VLCD, and Conservative Program and the role of weight loss medications.  - Patient is interested in pursuing Conservative Program and follow up visits with medical weight management provider.  - Explained the importance of making lifestyle changes in addition to starting any anti-obesity medications.   - Initial weight loss goal of 5-10% weight loss for improved health. Weight loss can improve patient's co-morbid conditions and/or prevent weight-related complications.  - Weight is not at goal and patient has been unable to achieve a meaningful weight loss above 5% using various programs and tools for more than 6 months  - Labs reviewed.     General Recommendations:  Nutrition:  Eat breakfast daily.  Do not skip meals.      Food log (ie.) www.AbilTo.com, sparkpeople.com, loseit.com, calorieking.com, etc.     Practice mindful eating.  Be sure to set aside time to eat, eat slowly, and savor your food.     Hydration:    At least 64oz of water daily.  No sugar sweetened beverages.  No juice (eat the fruit instead).     Exercise:  Studies have shown that the ideal exercise goal is somewhere between 150 to 300 minutes of moderate intensity exercise a week.  Start with exercising 10 minutes every other day and gradually increase physical activity with a goal of at least 150 minutes of moderate intensity exercise a week, divided over at least 3 days a week.  An example of this would be exercising 30 minutes a day, 5 days a week.  Resistance training can increase muscle mass and increase our resting metabolic rate.   FULL BODY resistance training is recommended 2-3 times a week.  Do not do this on consecutive days to allow for muscle recovery.     Aim for a bare minimum 5000 steps, even on  days you do not exercise.     Monitoring:   Weigh yourself daily.  If this causes undue stress, then just weigh yourself once a week.  Weigh yourself the same time of the day with the same amount of clothing on.  Preferably this should be done after waking up, before you eat, and with no clothing or minimal clothing on.     Specific Goals:  Calorie goal:  5624-5185 soren/day (Provided with meal plan to follow)         Carolyne was seen today for follow-up.    Diagnoses and all orders for this visit:    Obesity, Class II, BMI 35-39.9    Primary hypertension    Hypertensive heart disease with heart failure (HCC)        Total time spent reviewing chart, interviewing patient, examining patient, discussing plan, answering all questions, and documentin minutes with >50% face-to-face time with the patient.    Follow up in approximately 3 months with Non-Surgical Physician/Advanced Practitioner.    Subjective:   Chief Complaint   Patient presents with    Follow-up       Patient ID: Carolyne Pop  is a 64 y.o. female with excess weight/obesity here to pursue weight management.  Patient is pursuing Conservative Program. Patient has continued gaining weight. Recently prescribed Zepbound by PCP . PA pending. Has been adherent with meal plan. Walking regularly.     Most recent notes and records were reviewed.    HPI    Wt Readings from Last 20 Encounters:   25 114 kg (251 lb)   25 117 kg (259 lb)   25 116 kg (256 lb)   25 114 kg (250 lb 12.8 oz)   09/15/23 113 kg (249 lb)   09/10/23 115 kg (254 lb)       Patient presents today to medical weight management office for follow up.    Weight loss medication and dose: Zepbound 2.5 mg prescribed by PCP    Starting MWM weight: 250 lbs (3/25/2025)   Current Weight: 251 lbs (2025)  Difference: +1 lb    Starting BMI: 38.13 (3/25/2025)  Current BMI: 38.16 (2025)  Goal weight: 220 lbs         Diet recall:  B: sandwich and tea  L:  "usually skips (not hungry)  D: protein, carb; sometimes veggies but limited due to warfarin   S: sometimes chips   Take out frequency: never     Hydration: water, juice  Alcohol: none  Smoking: none  Exercise: none  Occupation: teacher  Sleep: 10 hrs  STOP ban/8     Colonoscopy: UTD  Mammogram: UTD      The following portions of the patient's history were reviewed and updated as appropriate: allergies, current medications, past family history, past medical history, past social history, past surgical history, and problem list.    Family History[1]     Review of Systems   Constitutional:  Negative for chills and fever.   HENT:  Negative for ear pain and sore throat.    Eyes:  Negative for pain and visual disturbance.   Respiratory:  Negative for cough and shortness of breath.    Cardiovascular:  Negative for chest pain and palpitations.   Gastrointestinal:  Negative for abdominal pain and vomiting.   Genitourinary:  Negative for dysuria and hematuria.   Musculoskeletal:  Negative for arthralgias and back pain.   Skin:  Negative for color change and rash.   Neurological:  Negative for seizures and syncope.   All other systems reviewed and are negative.      Objective:  /90 (BP Location: Left arm, Patient Position: Sitting, Cuff Size: Large)   Pulse 77   Ht 5' 8\" (1.727 m)   Wt 114 kg (251 lb)   SpO2 98%   BMI 38.16 kg/m²     Physical Exam  Constitutional:       General: She is not in acute distress.     Appearance: Normal appearance. She is obese. She is not ill-appearing, toxic-appearing or diaphoretic.   HENT:      Head: Normocephalic and atraumatic.   Pulmonary:      Effort: Pulmonary effort is normal.     Musculoskeletal:         General: Normal range of motion.      Cervical back: Normal range of motion.     Skin:     General: Skin is warm.     Neurological:      Mental Status: She is alert and oriented to person, place, and time.     Psychiatric:         Mood and Affect: Mood normal.         " Behavior: Behavior normal.            Labs   Most recent labs reviewed   Lab Results   Component Value Date    SODIUM 141 06/12/2025    K 4.4 06/12/2025     06/12/2025    CO2 23 06/12/2025    AGAP 6 09/09/2023    BUN 16 06/12/2025    CREATININE 1.05 (H) 06/12/2025    GLUC 87 06/12/2025    CALCIUM 8.5 09/09/2023    AST 17 06/12/2025    ALT 12 06/12/2025    ALKPHOS 89 09/09/2023    TP 6.7 06/12/2025    TBILI 0.5 06/12/2025    EGFR 60 06/12/2025     Lab Results   Component Value Date    HGBA1C 6.0 (H) 06/12/2025     Lab Results   Component Value Date    YAU6OJIENDUD 1.550 09/08/2023    TSH 1.470 06/12/2025     Lab Results   Component Value Date    CHOLESTEROL 173 06/12/2025     Lab Results   Component Value Date    HDL 51 06/12/2025     Lab Results   Component Value Date    TRIG 83 06/12/2025     Lab Results   Component Value Date    LDLCALC 107 (H) 06/12/2025           Weight Management  Jose Rafael Jiang PA-C         [1] No family history on file.

## 2025-06-25 NOTE — PATIENT INSTRUCTIONS
- Zepbound prescribed by PCP. Awaiting PA.   - Recommend meeting with Dietician.   - Discussed options of HealthyCORE-Intensive Lifestyle Intervention Program, Very Low Calorie Diet-VLCD, and Conservative Program and the role of weight loss medications.  - Patient is interested in pursuing Conservative Program and follow up visits with medical weight management provider.  - Explained the importance of making lifestyle changes in addition to starting any anti-obesity medications.   - Initial weight loss goal of 5-10% weight loss for improved health. Weight loss can improve patient's co-morbid conditions and/or prevent weight-related complications.  - Weight is not at goal and patient has been unable to achieve a meaningful weight loss above 5% using various programs and tools for more than 6 months  - Labs reviewed.     General Recommendations:  Nutrition:  Eat breakfast daily.  Do not skip meals.      Food log (ie.) www.Linux Voice.com, sparkpeople.com, loseit.com, DataMotion.com, etc.     Practice mindful eating.  Be sure to set aside time to eat, eat slowly, and savor your food.     Hydration:    At least 64oz of water daily.  No sugar sweetened beverages.  No juice (eat the fruit instead).     Exercise:  Studies have shown that the ideal exercise goal is somewhere between 150 to 300 minutes of moderate intensity exercise a week.  Start with exercising 10 minutes every other day and gradually increase physical activity with a goal of at least 150 minutes of moderate intensity exercise a week, divided over at least 3 days a week.  An example of this would be exercising 30 minutes a day, 5 days a week.  Resistance training can increase muscle mass and increase our resting metabolic rate.   FULL BODY resistance training is recommended 2-3 times a week.  Do not do this on consecutive days to allow for muscle recovery.     Aim for a bare minimum 5000 steps, even on days you do not exercise.     Monitoring:   Weigh  yourself daily.  If this causes undue stress, then just weigh yourself once a week.  Weigh yourself the same time of the day with the same amount of clothing on.  Preferably this should be done after waking up, before you eat, and with no clothing or minimal clothing on.     Specific Goals:  Calorie goal:  7641-7776 soren/day (Provided with meal plan to follow)

## 2025-06-26 ENCOUNTER — TELEPHONE (OUTPATIENT)
Dept: FAMILY MEDICINE CLINIC | Facility: CLINIC | Age: 65
End: 2025-06-26

## 2025-06-26 ENCOUNTER — CONSULT (OUTPATIENT)
Dept: GASTROENTEROLOGY | Facility: CLINIC | Age: 65
End: 2025-06-26
Payer: COMMERCIAL

## 2025-06-26 ENCOUNTER — APPOINTMENT (OUTPATIENT)
Dept: LAB | Facility: HOSPITAL | Age: 65
End: 2025-06-26

## 2025-06-26 VITALS
HEART RATE: 75 BPM | DIASTOLIC BLOOD PRESSURE: 84 MMHG | BODY MASS INDEX: 38.04 KG/M2 | OXYGEN SATURATION: 98 % | SYSTOLIC BLOOD PRESSURE: 124 MMHG | WEIGHT: 251 LBS | RESPIRATION RATE: 18 BRPM | HEIGHT: 68 IN | TEMPERATURE: 98 F

## 2025-06-26 DIAGNOSIS — Z12.11 ENCOUNTER FOR SCREENING FOR MALIGNANT NEOPLASM OF COLON: ICD-10-CM

## 2025-06-26 PROCEDURE — 99203 OFFICE O/P NEW LOW 30 MIN: CPT | Performed by: PHYSICIAN ASSISTANT

## 2025-06-26 RX ORDER — PREDNISOLONE ACETATE 10 MG/ML
SUSPENSION/ DROPS OPHTHALMIC
COMMUNITY
Start: 2025-06-23

## 2025-06-26 RX ORDER — CIPROFLOXACIN HYDROCHLORIDE 3.5 MG/ML
1 SOLUTION/ DROPS TOPICAL
COMMUNITY
Start: 2025-06-18

## 2025-06-26 RX ORDER — DICLOFENAC SODIUM 1 MG/ML
SOLUTION/ DROPS OPHTHALMIC
COMMUNITY
Start: 2025-06-23

## 2025-06-26 NOTE — TELEPHONE ENCOUNTER
----- Message from LOUIS Cuba sent at 6/25/2025  2:11 PM EDT -----  Can we find out if her insurance covers any GLP-1 medication?  Apparently her pharmacy told her that the Zepbound was $1000 a month but I did not see any alternatives

## 2025-06-26 NOTE — TELEPHONE ENCOUNTER
PA for ZEPBOUND 2.5MG SUBMITTED           via    [x]SS    [x]PA sent as URGENT    All office notes, labs and other pertaining documents and studies sent. Clinical questions answered. Awaiting determination from insurance company.     Turnaround time for your insurance to make a decision on your Prior Authorization can take 7-21 business days.

## 2025-06-26 NOTE — TELEPHONE ENCOUNTER
PA for ZEPBOUND 2.5MG  EXCLUDED from plan       Reason:(Screenshot if applicable)        Message sent to office clinical pool Yes

## 2025-06-26 NOTE — TELEPHONE ENCOUNTER
She received her medication that was discussed yesterday and would like you to call her asap to discuss.

## 2025-06-26 NOTE — PROGRESS NOTES
Name: Carolyne Pop      : 1960      MRN: [id number]  Encounter Provider: Lynnette Marquez PA-C  Encounter Date: 2025   Encounter department: [facility] [place]  :  Assessment & Plan  Encounter for screening for malignant neoplasm of colon    Orders:    Ambulatory Referral to Gastroenterology    iFOBT (FIT); Future  It is unclear whether or not the patient is due for colonoscopy.  She estimates that her last colonoscopy was done in [place] about 5 years ago.  She does not recall having a personal history of colon polyps or there is no family history of colon cancer to her knowledge.  Patient was able to give me the name of her previous primary from [place], that might have the records.  Dr. King Almonte in [facility].  Patient will sign release form.  Otherwise, the patient is open to performing a FIT test in the meantime.  She has no alarm symptoms or other GI concerns at the time of today's visit.  In the near future if we are going to be scheduling colonoscopy we will need her to hold her Coumadin for 5 days.  We would need to send a clearance form to her cardiologist at [facility].      History of Present Illness   HPI  Carolyne Pop is a 64 y.o. female with history of hypertension, hyperlipidemia, prediabetes, atrial fibrillation on Coumadin and mitral stenosis.  Patient follows with cardiology at [facility].  She is currently on Zepbound for weight loss.  Patient presents to the office today for screening colonoscopy consult.  Patient was encouraged to presents the office today by her gynecologist.  Patient denies change in bowel habits, signs or GI bleeding, family history of colon cancer, or personal history of colon polyps.  She estimates that her last colonoscopy was about 5 years on [place].  Patient cannot recall the name of the provider, but was able to give me the name of her previous PCP that might have the  "records.          Review of Systems   Constitutional:  Negative for appetite change, chills, diaphoresis, fatigue and unexpected weight change.   HENT:  Negative for sore throat and trouble swallowing.    Eyes:  Negative for discharge and redness.   Respiratory:  Negative for cough, shortness of breath and wheezing.    Cardiovascular:  Negative for chest pain and palpitations.   Gastrointestinal:  Negative for abdominal pain, anal bleeding, blood in stool, constipation, diarrhea, nausea, rectal pain and vomiting.   Endocrine: Negative for cold intolerance and heat intolerance.   Musculoskeletal:  Negative for joint swelling and myalgias.   Skin:  Negative for pallor and rash.   Neurological:  Negative for dizziness, tremors, weakness, light-headedness, numbness and headaches.   Hematological:  Negative for adenopathy. Does not bruise/bleed easily.   Psychiatric/Behavioral:  Negative for behavioral problems, confusion, dysphoric mood and sleep disturbance. The patient is not nervous/anxious.           Objective   /84 (BP Location: Left arm, Patient Position: Sitting, Cuff Size: Standard)   Pulse 75   Temp 98 °F (36.7 °C) (Tympanic)   Resp 18   Ht 5' 8\" (1.727 m)   Wt 114 kg (251 lb)   SpO2 98%   BMI 38.16 kg/m²      Physical Exam  Constitutional:       General: She is not in acute distress.     Appearance: She is well-developed. She is not diaphoretic.   HENT:      Head: Normocephalic and atraumatic.     Eyes:      General: No scleral icterus.     Conjunctiva/sclera: Conjunctivae normal.      Pupils: Pupils are equal, round, and reactive to light.     Neck:      Thyroid: No thyromegaly.      Trachea: No tracheal deviation.     Cardiovascular:      Rate and Rhythm: Normal rate and regular rhythm.   Pulmonary:      Effort: Pulmonary effort is normal.      Breath sounds: Normal breath sounds. No wheezing or rales.   Abdominal:      General: Bowel sounds are normal. There is no distension.      Palpations: " Abdomen is soft. Abdomen is not rigid. There is no mass.      Tenderness: There is no abdominal tenderness. There is no guarding or rebound.     Musculoskeletal:      Cervical back: Neck supple.   Lymphadenopathy:      Cervical: No cervical adenopathy.     Skin:     General: Skin is warm and dry.      Findings: No erythema or rash.     Neurological:      Mental Status: She is alert and oriented to person, place, and time.     Psychiatric:         Behavior: Behavior normal.

## 2025-06-27 ENCOUNTER — TELEPHONE (OUTPATIENT)
Dept: GASTROENTEROLOGY | Facility: CLINIC | Age: 65
End: 2025-06-27

## 2025-06-27 NOTE — TELEPHONE ENCOUNTER
Pt returned call and I relayed the info and let her know she will be due next July, she says thank you and will talk to us then.

## 2025-06-27 NOTE — TELEPHONE ENCOUNTER
Please let patient know that we received her records.  Appears she had a colonoscopy in July 2021 in [place].  She had a very small polyp removed and diverticulosis noted.  She was recommended a 5-year recall which would be in July 2026 thank you.    Maria Alejandra, can you update recall in the system thank you

## 2025-07-02 NOTE — TELEPHONE ENCOUNTER
You can try and see if it is covered she will need an auth but worth a shot, I don't see the fax yet so we can try

## 2025-07-09 ENCOUNTER — TELEPHONE (OUTPATIENT)
Dept: FAMILY MEDICINE CLINIC | Facility: CLINIC | Age: 65
End: 2025-07-09

## 2025-07-09 NOTE — TELEPHONE ENCOUNTER
[facility] Critical Care Medicine       Date:  7/9/2025  Patient:  Rodrigue Palacios  YOB: 1953  MRN:  [id number]   Admit Date:  7/9/2025  Hospital Length of Stay: 0   ICU Length of Stay: 27m       Chief complaint: altered mental status            History of Present Illness:  Rodrigue Palacios is a 71 y.o. year old male patient with past medical history of HTN, HLD, SCOOTER, GERD, T2DM, CKD 3B, RA, bladder cancer s/p R nephrectomy and radical cystectomy and ileal conduit (5/21/25) who presented to [place] emergency department 7/7 with complaints of confusion per his son who called EMS.  Upon EMS arrival he was found to be hypoglycemic with BGL 52.  Initial work-up was also significant for HARMAN with initial sCr 7.7.  Transfer request was placed from [place] ED to ICU for persistent hypoglycemia.         OSH Course: Remained hypoglycemia after repetitive dextrose ampules, placed on D10 infusion. Endocrinology consulted and recommended continuing D10 infusion and subcutaneous octreotide until hypoglycemia resolved.  Nephrology consulted and deemed no indications for urgent dialysis, ordered renal US.  Urology consulted and ordered IV lasix, NM kidney flow scan, and CT AP.  Renal US significant for moderate left-sided hydronephrosis with suggestion of a 1.4 cm nonobstructing calculus in the inferior pole.  CT AP confirmed finding on renal US of obstructing calculus at L PUJ w moderate hydronephrosis.  Urology recommended IR consult for urgent perc-neph tube placement, but this was unable to be done at [facility] today, therefore transfer to  ICU was initiated.       Interval ICU Events:  7/9: Arrived to ICU on D10 infusion. HDS. NPO at midnight for presumed perc-neph tube placement in am with IR. Repeat labs, draw blood cultures.     Objective     Medical History:  Medical History[1]  Surgical History[2]  Prescriptions Prior to Admission[3]  Orencia [abatacept]  Social History[4]  Family History[5]    Hospital  appt scheduled with Dr. Adams 9/15 at 10:00    Medications:    Continuous Medications[6]    Current Medications[7]    Review of Systems:  14 point review of systems was completed and negative except for those specially mention in my HPI    Physical Exam:    Heart Rate:  [57-91]   Temp:  [36.8 °C (98.2 °F)-37.4 °C (99.3 °F)]   Resp:  [13-23]   BP: (133-168)/()   Weight:  [86.8 kg (191 lb 5.8 oz)-94.8 kg (208 lb 15.9 oz)]   SpO2:  [89 %-100 %]     Physical Exam  Constitutional:       General: He is not in acute distress.     Appearance: He is not ill-appearing.   HENT:      Head: Atraumatic.   Eyes:      Extraocular Movements: Extraocular movements intact.      Conjunctiva/sclera: Conjunctivae normal.      Pupils: Pupils are equal, round, and reactive to light.   Cardiovascular:      Rate and Rhythm: Normal rate and regular rhythm.      Pulses: Normal pulses.      Heart sounds: Normal heart sounds.   Pulmonary:      Effort: Pulmonary effort is normal. No respiratory distress.      Breath sounds: Normal breath sounds.   Abdominal:      Palpations: Abdomen is soft.      Tenderness: There is abdominal tenderness.      Comments: Mild LLQ tenderness to palpation, non-peritonitic   Musculoskeletal:      Right lower leg: No edema.      Left lower leg: No edema.   Skin:     General: Skin is warm.      Coloration: Skin is pale.   Neurological:      General: No focal deficit present.      Mental Status: He is alert. He is disoriented.         Objective:    I have reviewed all medications, laboratory results, and imaging pertinent for today's encounter.         No intake or output data in the 24 hours ending 07/09/25 1803    Daily Labs:  CBC:   Results from last 7 days   Lab Units 07/08/25  0520 07/07/25  1832   WBC AUTO x10*3/uL 12.0* 14.4*   HEMOGLOBIN g/dL 9.9* 10.1*   HEMATOCRIT % 31.2* 32.0*   MCV fL 86 87     BMP:    Results from last 7 days   Lab Units 07/09/25  0452 07/09/25  0052 07/08/25  1249 07/08/25  0519 07/07/25  2216 07/07/25  1832   SODIUM mmol/L 130*  131*   < > 132*   < > 133*   POTASSIUM mmol/L 5.0 5.5*   < > 4.7   < > 4.7   CHLORIDE mmol/L 100 100   < > 100   < > 99   CO2 mmol/L 18* 19*   < > 22   < > 21   BUN mg/dL 86* 87*   < > 88*   < > 82*   CREATININE mg/dL 7.65* 7.40*   < > 7.78*   < > 7.74*   CALCIUM mg/dL 8.6 8.6   < > 8.7   < > 9.3   GLUCOSE mg/dL 53* 63*   < > 38*   < > 43*   MAGNESIUM mg/dL  --   --   --  1.94  --  1.80    < > = values in this interval not displayed.     ABG:      VBG:   Results from last 7 days   Lab Units 07/08/25  0522   POCT PH, VENOUS pH 7.36   POCT PCO2, VENOUS mm Hg 39*   POCT PO2, VENOUS mm Hg 29*   POCT SO2, VENOUS % 45   POCT HCO3 CALCULATED, VENOUS mmol/L 22.0   POCT LACTATE, VENOUS mmol/L 0.7             Assessment/Plan:    I am currently managing this critically ill patient for the following problems:    Neurology/Psychiatry/Pain Control/Sedation:   Acute metabolic encephalopathy 2/2 hypoglycemia   - Pain Control: acetaminophen PRN  - CAM ICU qshift, sleep-wake hygiene, delirium precautions      Respiratory/ENT:  No acute issues  - Supplemental O2: none, on room air   - Maintain SpO2 >92%  - Continuous pulse ox monitoring      Cardiovascular:   History of hypertension   - Hold home lisinopril   - Maintain MAPs >65  - Continuous cardiac monitoring   - EKGs PRN for ACS symptoms, arrhythmias      Gastrointestinal:  No active concerns   - Diet: renal, NPO at midnight   - BR: PRN  - GI Prophylaxis: home PPI     Renal/Volume Status/Electrolytes:  Acute renal failure 2/2 obstructive nephropathy   Moderate hydronephrosis 2/2 obstructive calculus @ L PUJ  Bladder cancer s/p cystectomy w/ ileal conduit creation and R nephro-ureterectomy (5/21/25)  Hyponatremia likely 2/2 free water administration  CKD 3B  :: OSH I/Os: total 1.8L positive, 1.6L UOP over last 24 hours  - Baseline Cr ~1.5  - Consult urology   - Consult IR for perc neph tube placement   - Consult nephrology   - RFP BID to assess for hyperkalemia until neph tube  placed   - Replete electrolytes to maintain K >4.0 and Mg >2.0  - Daily RFP, Mg    Endocrinology:  Type 2 diabetes mellitus, insulin-dependent   Profound hypoglycemia 2/2 sulfonylurea use iso renal failure    - Home diabetic regimen: glimepiride, lantus 10 units hs   - Endocrinology consulted at OSH  -- Recommended dextrose gtt, sq octreotide 100mcg q6hrs until hypoglycemia resolves, discontinue glimepiride   -- POCT glucose q2hrs + D10 infusion @ 100cc/hr - titrate to maintain euglycemia   -- May consider D20 infusion iso worsening hyponatremia      Infectious Disease:  Leukocytosis  Pyuria  - Antibiotics: hold off on empiric abx pending repeat labs, no CBC in 2 days to assess infection burden  -- No current SIRS criteria   - Blood cultures: will order   - Urine culture: no significant growth, likely contaminated regardless as drawn from ileal conduit   - Monitor SIRS criteria     Hematology/Oncology:  Bladder cancer   - Transfuse if Hgb <7.0 or symptomatic anemia  - Daily CBC     MSK/Skin:  No acute issues   - PT/OT eval   - ICU skin protocol, padded pressure points  - Q2hr turns      Ethics/Code Status:  Full code      :  DVT Prophylaxis: SQH, SCDs  GI Prophylaxis: home PPI  Bowel Regimen: PRN  Diet: renal, NPO at midnight  CVC: none  [place]: none  Aquino: none  Restraints: none  Disposition: admit to ICU    Critical Care Time:  50 minutes spent in preparing to see patient (I.e. labs, imaging, etc.), documentation, discussion plan of care with patient/family/caregiver, and/or coordination of care with multidisciplinary team including the attending. Time does not include completion of procedure time.     Vivek Laughlin PA-C  Pulmonary & Critical Care Medicine   [facility]         [1]   Past Medical History:  Diagnosis Date    Arthritis     rhuematoid    Bladder cancer (Multi)     CKD (chronic kidney disease)     Dorsalgia, unspecified 07/28/2020    Back pain without radiation    GERD  "(gastroesophageal reflux disease)     controlled    Hearing aid worn     HL (hearing loss)     Hyperlipidemia     Hypertension     Local infection of the skin and subcutaneous tissue, unspecified 10/11/2013    Nonvenomous insect bite with infection    Nephrolithiasis     Other muscle spasm 02/14/2018    Muscle spasms of neck    Other specified diseases of anus and rectum 03/22/2013    Anal pain    Personal history of other diseases of the digestive system 09/18/2013    History of gastroenteritis    Personal history of other diseases of the respiratory system 04/27/2020    History of acute bronchitis    Personal history of other diseases of the respiratory system 06/24/2019    History of upper respiratory infection    Personal history of other infectious and parasitic diseases     History of candidiasis of mouth    Personal history of other specified conditions 03/22/2013    History of abnormal weight loss    Personal history of other specified conditions 02/24/2014    History of numbness    Personal history of other specified conditions 07/14/2017    History of abdominal pain    Pneumonia, unspecified organism 01/25/2016    Pneumonia involving right lung    Rash and other nonspecific skin eruption 04/29/2013    Rash    Sleep apnea     CPAP    Type 2 diabetes mellitus    [2]   Past Surgical History:  Procedure Laterality Date    BLADDER SURGERY  2025    Removal malignant tumor    CHOLECYSTECTOMY  07/07/2016    Cholecystectomy    HERNIA REPAIR  07/07/2016    Inguinal Hernia Repair    SHOULDER SURGERY Right    [3]   Medications Prior to Admission   Medication Sig Dispense Refill Last Dose/Taking    atorvastatin (Lipitor) 40 mg tablet TAKE ONE TABLET BY MOUTH DAILY 90 tablet 3     Basaglar KwikPen U-100 Insulin 100 unit/mL (3 mL) pen Use up to 30 units daily (Patient taking differently: Inject 20 Units under the skin once daily at bedtime.) 30 mL 2     insulin syringe-needle U-100 (BD Insulin Syringe) 1 mL 26 x 1/2\" " "syringe        lisinopril 40 mg tablet Take 0.5 tablets (20 mg) by mouth every 12 hours. 30 tablet 2     montelukast (Singulair) 10 mg tablet Take 1 tablet (10 mg) by mouth once daily.       pantoprazole (ProtoNix) 40 mg EC tablet TAKE ONE TABLET BY MOUTH EVERY DAY 90 tablet 3     pen needle, diabetic (BD Luann 2nd Gen Pen Needle) 32 gauge x 5/32\" needle use DAILY 100 each 1     traZODone (Desyrel) 50 mg tablet Take 1-2 tablets ( mg) by mouth once daily. 180 tablet 3    [4]   Social History  Tobacco Use    Smoking status: Never     Passive exposure: Never    Smokeless tobacco: Never   Vaping Use    Vaping status: Never Used   Substance Use Topics    Alcohol use: Yes     Comment: rare-couple drinks a month    Drug use: Never   [5]   Family History  Problem Relation Name Age of Onset    Ovarian cancer Mother      Other (asbestosis) Father     [6] dextrose 10 % in water (D10W), 100 mL/hr  [7]   Current Facility-Administered Medications:     [START ON 7/10/2025] atorvastatin (Lipitor) tablet 40 mg, 40 mg, oral, Daily, Vivek Laughlin PA-C    dextrose 10 % in water (D10W) infusion, 100 mL/hr, intravenous, Continuous, Vivek Laughlin PA-C    dextrose 50 % injection 12.5 g, 12.5 g, intravenous, q15 min PRN, Vivek Laughlin PA-C    dextrose 50 % injection 25 g, 25 g, intravenous, q15 min PRN, Vivek Laughlin PA-C    glucagon (Glucagen) injection 1 mg, 1 mg, intramuscular, q15 min PRN, Vivek Laughlin PA-C    glucagon (Glucagen) injection 1 mg, 1 mg, intramuscular, q15 min PRN, Vivek Laughlin PA-C    heparin (porcine) injection 5,000 Units, 5,000 Units, subcutaneous, q8h, Vivek Laughlin PA-C    [Held by provider] lisinopril tablet 20 mg, 20 mg, oral, q12h, Vivek Laughlin PA-C    octreotide (SandoSTATIN) injection 100 mcg, 100 mcg, subcutaneous, q6h, Vivek Laughlin PA-C    [START ON 7/10/2025] pantoprazole (ProtoNix) EC tablet 20 mg, 20 mg, oral, Daily, Vivek Laughlin PA-C    "

## 2025-07-16 ENCOUNTER — TELEPHONE (OUTPATIENT)
Age: 65
End: 2025-07-16

## 2025-07-16 ENCOUNTER — APPOINTMENT (OUTPATIENT)
Age: 65
End: 2025-07-16
Payer: COMMERCIAL

## 2025-07-16 ENCOUNTER — OFFICE VISIT (OUTPATIENT)
Dept: FAMILY MEDICINE CLINIC | Facility: CLINIC | Age: 65
End: 2025-07-16
Payer: COMMERCIAL

## 2025-07-16 VITALS
SYSTOLIC BLOOD PRESSURE: 140 MMHG | DIASTOLIC BLOOD PRESSURE: 70 MMHG | HEART RATE: 76 BPM | BODY MASS INDEX: 37.95 KG/M2 | HEIGHT: 68 IN | WEIGHT: 250.4 LBS | OXYGEN SATURATION: 99 % | RESPIRATION RATE: 12 BRPM

## 2025-07-16 DIAGNOSIS — Z11.59 NEED FOR HEPATITIS C SCREENING TEST: ICD-10-CM

## 2025-07-16 DIAGNOSIS — Z11.4 SCREENING FOR HIV (HUMAN IMMUNODEFICIENCY VIRUS): ICD-10-CM

## 2025-07-16 DIAGNOSIS — I11.0 HYPERTENSIVE HEART DISEASE WITH HEART FAILURE (HCC): ICD-10-CM

## 2025-07-16 DIAGNOSIS — E55.9 VITAMIN D DEFICIENCY: ICD-10-CM

## 2025-07-16 DIAGNOSIS — Z23 ENCOUNTER FOR IMMUNIZATION: ICD-10-CM

## 2025-07-16 DIAGNOSIS — E66.812 OBESITY, CLASS II, BMI 35-39.9: ICD-10-CM

## 2025-07-16 DIAGNOSIS — N28.89 RENAL MASS: Primary | ICD-10-CM

## 2025-07-16 DIAGNOSIS — Z00.00 ANNUAL PHYSICAL EXAM: ICD-10-CM

## 2025-07-16 DIAGNOSIS — R51.9 NEW ONSET HEADACHE: ICD-10-CM

## 2025-07-16 DIAGNOSIS — R73.03 PREDIABETES: ICD-10-CM

## 2025-07-16 DIAGNOSIS — Z11.3 SCREEN FOR STD (SEXUALLY TRANSMITTED DISEASE): ICD-10-CM

## 2025-07-16 DIAGNOSIS — I48.0 PAROXYSMAL ATRIAL FIBRILLATION (HCC): ICD-10-CM

## 2025-07-16 DIAGNOSIS — F32.A DEPRESSION, UNSPECIFIED DEPRESSION TYPE: ICD-10-CM

## 2025-07-16 LAB
25(OH)D3 SERPL-MCNC: 42 NG/ML (ref 30–100)
ALBUMIN SERPL BCG-MCNC: 3.7 G/DL (ref 3.5–5)
ALP SERPL-CCNC: 102 U/L (ref 34–104)
ALT SERPL W P-5'-P-CCNC: 9 U/L (ref 7–52)
ANION GAP SERPL CALCULATED.3IONS-SCNC: 11 MMOL/L (ref 4–13)
AST SERPL W P-5'-P-CCNC: 16 U/L (ref 13–39)
BASOPHILS # BLD AUTO: 0.03 THOUSANDS/ÂΜL (ref 0–0.1)
BASOPHILS NFR BLD AUTO: 1 % (ref 0–1)
BILIRUB SERPL-MCNC: 0.39 MG/DL (ref 0.2–1)
BUN SERPL-MCNC: 12 MG/DL (ref 5–25)
CALCIUM SERPL-MCNC: 9 MG/DL (ref 8.4–10.2)
CHLORIDE SERPL-SCNC: 103 MMOL/L (ref 96–108)
CHOLEST SERPL-MCNC: 164 MG/DL (ref ?–200)
CO2 SERPL-SCNC: 27 MMOL/L (ref 21–32)
CREAT SERPL-MCNC: 1.1 MG/DL (ref 0.6–1.3)
EOSINOPHIL # BLD AUTO: 0.07 THOUSAND/ÂΜL (ref 0–0.61)
EOSINOPHIL NFR BLD AUTO: 1 % (ref 0–6)
ERYTHROCYTE [DISTWIDTH] IN BLOOD BY AUTOMATED COUNT: 13 % (ref 11.6–15.1)
EST. AVERAGE GLUCOSE BLD GHB EST-MCNC: 131 MG/DL
GFR SERPL CREATININE-BSD FRML MDRD: 53 ML/MIN/1.73SQ M
GLUCOSE SERPL-MCNC: 78 MG/DL (ref 65–140)
HBA1C MFR BLD: 6.2 %
HCT VFR BLD AUTO: 41.9 % (ref 34.8–46.1)
HDLC SERPL-MCNC: 53 MG/DL
HGB BLD-MCNC: 13.1 G/DL (ref 11.5–15.4)
IMM GRANULOCYTES # BLD AUTO: 0.01 THOUSAND/UL (ref 0–0.2)
IMM GRANULOCYTES NFR BLD AUTO: 0 % (ref 0–2)
LDLC SERPL CALC-MCNC: 88 MG/DL (ref 0–100)
LYMPHOCYTES # BLD AUTO: 2.12 THOUSANDS/ÂΜL (ref 0.6–4.47)
LYMPHOCYTES NFR BLD AUTO: 40 % (ref 14–44)
MCH RBC QN AUTO: 28.7 PG (ref 26.8–34.3)
MCHC RBC AUTO-ENTMCNC: 31.3 G/DL (ref 31.4–37.4)
MCV RBC AUTO: 92 FL (ref 82–98)
MONOCYTES # BLD AUTO: 0.44 THOUSAND/ÂΜL (ref 0.17–1.22)
MONOCYTES NFR BLD AUTO: 8 % (ref 4–12)
NEUTROPHILS # BLD AUTO: 2.59 THOUSANDS/ÂΜL (ref 1.85–7.62)
NEUTS SEG NFR BLD AUTO: 50 % (ref 43–75)
NONHDLC SERPL-MCNC: 111 MG/DL
NRBC BLD AUTO-RTO: 0 /100 WBCS
PLATELET # BLD AUTO: 241 THOUSANDS/UL (ref 149–390)
PMV BLD AUTO: 11.9 FL (ref 8.9–12.7)
POTASSIUM SERPL-SCNC: 4.2 MMOL/L (ref 3.5–5.3)
PROT SERPL-MCNC: 7.3 G/DL (ref 6.4–8.4)
RBC # BLD AUTO: 4.56 MILLION/UL (ref 3.81–5.12)
SODIUM SERPL-SCNC: 141 MMOL/L (ref 135–147)
TRIGL SERPL-MCNC: 116 MG/DL (ref ?–150)
TSH SERPL DL<=0.05 MIU/L-ACNC: 1.83 UIU/ML (ref 0.45–4.5)
WBC # BLD AUTO: 5.26 THOUSAND/UL (ref 4.31–10.16)

## 2025-07-16 PROCEDURE — 82306 VITAMIN D 25 HYDROXY: CPT

## 2025-07-16 PROCEDURE — 99396 PREV VISIT EST AGE 40-64: CPT

## 2025-07-16 PROCEDURE — 86803 HEPATITIS C AB TEST: CPT

## 2025-07-16 PROCEDURE — 85025 COMPLETE CBC W/AUTO DIFF WBC: CPT

## 2025-07-16 PROCEDURE — 90677 PCV20 VACCINE IM: CPT

## 2025-07-16 PROCEDURE — 90472 IMMUNIZATION ADMIN EACH ADD: CPT

## 2025-07-16 PROCEDURE — 84443 ASSAY THYROID STIM HORMONE: CPT

## 2025-07-16 PROCEDURE — 36415 COLL VENOUS BLD VENIPUNCTURE: CPT

## 2025-07-16 PROCEDURE — 99214 OFFICE O/P EST MOD 30 MIN: CPT

## 2025-07-16 PROCEDURE — 90471 IMMUNIZATION ADMIN: CPT

## 2025-07-16 PROCEDURE — 90715 TDAP VACCINE 7 YRS/> IM: CPT

## 2025-07-16 PROCEDURE — 83036 HEMOGLOBIN GLYCOSYLATED A1C: CPT

## 2025-07-16 PROCEDURE — 87389 HIV-1 AG W/HIV-1&-2 AB AG IA: CPT

## 2025-07-16 PROCEDURE — 80061 LIPID PANEL: CPT

## 2025-07-16 PROCEDURE — 80053 COMPREHEN METABOLIC PANEL: CPT

## 2025-07-16 RX ORDER — ZOSTER VACCINE RECOMBINANT, ADJUVANTED 50 MCG/0.5
0.5 KIT INTRAMUSCULAR ONCE
Qty: 1 EACH | Refills: 1 | Status: SHIPPED | OUTPATIENT
Start: 2025-07-16 | End: 2025-07-16

## 2025-07-16 RX ORDER — TIRZEPATIDE 2.5 MG/.5ML
2.5 INJECTION, SOLUTION SUBCUTANEOUS WEEKLY
Qty: 2 ML | Refills: 0 | Status: SHIPPED | OUTPATIENT
Start: 2025-07-16 | End: 2025-07-18 | Stop reason: SDUPTHER

## 2025-07-16 NOTE — ASSESSMENT & PLAN NOTE
Following closely in [place], normal sinus rhythm today.  Will continue to monitor  Orders:  •  Tirzepatide-Weight Management (Zepbound) 2.5 mg/0.5 mL subcutaneous solution (vial); Inject 0.5 mL (2.5 mg total) under the skin once a week

## 2025-07-16 NOTE — PATIENT INSTRUCTIONS
"Patient Education     Routine physical for adults   The Basics   Written by the doctors and editors at [facility]   What is a physical? -- A physical is a routine visit, or \"check-up,\" with your doctor. You might also hear it called a \"wellness visit\" or \"preventive visit.\"  During each visit, the doctor will:   Ask about your physical and mental health   Ask about your habits, behaviors, and lifestyle   Do an exam   Give you vaccines if needed   Talk to you about any medicines you take   Give advice about your health   Answer your questions  Getting regular check-ups is an important part of taking care of your health. It can help your doctor find and treat any problems you have. But it's also important for preventing health problems.  A routine physical is different from a \"sick visit.\" A sick visit is when you see a doctor because of a health concern or problem. Since physicals are scheduled ahead of time, you can think about what you want to ask the doctor.  How often should I get a physical? -- It depends on your age and health. In general, for people age 21 years and older:   If you are younger than 50 years, you might be able to get a physical every 3 years.   If you are 50 years or older, your doctor might recommend a physical every year.  If you have an ongoing health condition, like diabetes or high blood pressure, your doctor will probably want to see you more often.  What happens during a physical? -- In general, each visit will include:   Physical exam - The doctor or nurse will check your height, weight, heart rate, and blood pressure. They will also look at your eyes and ears. They will ask about how you are feeling and whether you have any symptoms that bother you.   Medicines - It's a good idea to bring a list of all the medicines you take to each doctor visit. Your doctor will talk to you about your medicines and answer any questions. Tell them if you are having any side effects that bother you. You " "should also tell them if you are having trouble paying for any of your medicines.   Habits and behaviors - This includes:   Your diet   Your exercise habits   Whether you smoke, drink alcohol, or use drugs   Whether you are sexually active   Whether you feel safe at home  Your doctor will talk to you about things you can do to improve your health and lower your risk of health problems. They will also offer help and support. For example, if you want to quit smoking, they can give you advice and might prescribe medicines. If you want to improve your diet or get more physical activity, they can help you with this, too.   Lab tests, if needed - The tests you get will depend on your age and situation. For example, your doctor might want to check your:   Cholesterol   Blood sugar   Iron level   Vaccines - The recommended vaccines will depend on your age, health, and what vaccines you already had. Vaccines are very important because they can prevent certain serious or deadly infections.   Discussion of screening - \"Screening\" means checking for diseases or other health problems before they cause symptoms. Your doctor can recommend screening based on your age, risk, and preferences. This might include tests to check for:   Cancer, such as breast, prostate, cervical, ovarian, colorectal, prostate, lung, or skin cancer   Sexually transmitted infections, such as chlamydia and gonorrhea   Mental health conditions like depression and anxiety  Your doctor will talk to you about the different types of screening tests. They can help you decide which screenings to have. They can also explain what the results might mean.   Answering questions - The physical is a good time to ask the doctor or nurse questions about your health. If needed, they can refer you to other doctors or specialists, too.  Adults older than 65 years often need other care, too. As you get older, your doctor will talk to you about:   How to prevent falling at " home   Hearing or vision tests   Memory testing   How to take your medicines safely   Making sure that you have the help and support you need at home  All topics are updated as new evidence becomes available and our peer review process is complete.  This topic retrieved from [employer] on: May 02, 2024.  Topic 275588 Version 1.0  Release: 32.4.3 - C32.122  © 2024 UpToDate, Inc. and/or its affiliates. All rights reserved.  Consumer Information Use and Disclaimer   Disclaimer: This generalized information is a limited summary of diagnosis, treatment, and/or medication information. It is not meant to be comprehensive and should be used as a tool to help the user understand and/or assess potential diagnostic and treatment options. It does NOT include all information about conditions, treatments, medications, side effects, or risks that may apply to a specific patient. It is not intended to be medical advice or a substitute for the medical advice, diagnosis, or treatment of a health care provider based on the health care provider's examination and assessment of a patient's specific and unique circumstances. Patients must speak with a health care provider for complete information about their health, medical questions, and treatment options, including any risks or benefits regarding use of medications. This information does not endorse any treatments or medications as safe, effective, or approved for treating a specific patient. UpToDate, Inc. and its affiliates disclaim any warranty or liability relating to this information or the use thereof.The use of this information is governed by the Terms of Use, available at https://www.[employer].com/en/know/clinical-effectiveness-terms. 2024© UpToDate, Inc. and its affiliates and/or licensors. All rights reserved.  Copyright   © 2024 UpToDate, Inc. and/or its affiliates. All rights reserved.

## 2025-07-16 NOTE — TELEPHONE ENCOUNTER
Writer contacted pt regarding referral for [facility] to verify services needed and explained to her that there is no opening available at this time. Pt agree to be place on [facility] wait list. Referral closed.

## 2025-07-16 NOTE — PROGRESS NOTES
Adult Annual Physical  Name: Carolyne Pop      : 1960      MRN: [id number]  Encounter Provider: LOUIS Cuba  Encounter Date: 2025   Encounter department: [facility] [address]    :  Assessment & Plan  Renal mass  Reviewed imaging and will do ultrasound in 6 month.   Orders:  •  US kidney and bladder; Future    Paroxysmal atrial fibrillation (HCC)  Following closely in [place], normal sinus rhythm today.  Will continue to monitor  Orders:  •  Tirzepatide-Weight Management (Zepbound) 2.5 mg/0.5 mL subcutaneous solution (vial); Inject 0.5 mL (2.5 mg total) under the skin once a week    Hypertensive heart disease with heart failure (HCC)  Wt Readings from Last 3 Encounters:   25 114 kg (250 lb 6.4 oz)   25 114 kg (251 lb)   25 114 kg (251 lb)     Great job with weight loss, appears euvolemic.  Will continue to monitor blood pressure continue to follow with cardiologist in [place]        Orders:  •  Tirzepatide-Weight Management (Zepbound) 2.5 mg/0.5 mL subcutaneous solution (vial); Inject 0.5 mL (2.5 mg total) under the skin once a week    Obesity, Class II, BMI 35-39.9  Prior Authorization Clinical Questions for Weight Management Pharmacotherapy    1. Does the patient have a contrainidcation to medication prescribed for weight management?: No  2. Does the patient have a diagnosis of obesity, confirmed by a BMI greater than or equal to 30 kg/m^2?: Yes  3. Does the patient have a BMI of greater than or equal to 27 kg/m^2 with at least one weight-related comorbidity/risk factor/complication (e.g. diabetes, dyslipidemia, coronary artery disease)?: Yes  4. Weight-related co-morbidities/risk factors: prediabetes, cardiovascular disease  5. WEGOVY CVA Indication: Does patient have established documented cardiovascular disease (history of a prior heart attack (myocardial infarction), stroke, or symptomatic peripheral arterial disease  (PAD)?: N/A  6. ZEPBOUND SCOTT Indication: Does patient have documented SCOTT diagnosed via sleep study (insurance will require copy of sleep study results for approval)?: N/A  7. Has the patient been on a weight loss regimen of low-calorie diet, increased physical activity, and lifestyle modifications for a minimum of 6 months?: Yes  8. Has the patient completed a comprehensive weight loss program (ie, Weight Watchers, Noom, Bariatrics, other naif on phone)? If so, what?: No  9. Does the patient have a history of type 2 diabetes?: No  10. Has the member tried and failed other weight loss medication within the past 12 months?: No  11. Will the member use requested medication in combination with another GLP agonist or weight loss drug?: No  12. Is the medication a controlled substance?: No  For renewals: Has the patient had a positive outcome with current weight management medication (i.e., change in body weight of at least 4-5% after 12-16 weeks on maximally tolerated dose)?: Yes     Baseline weight (in pounds): 259 lbs  Current weight (in pounds): 250.4 lbs  Weight loss percentage: -3.32%     Great job with weight loss, does have desire to transition to Zepbound,Recommend Mediterranean diet, 2.5 hours of exercise weekly.  Have fasting lab work we will call with results    Orders:  •  Tirzepatide-Weight Management (Zepbound) 2.5 mg/0.5 mL subcutaneous solution (vial); Inject 0.5 mL (2.5 mg total) under the skin once a week    Prediabetes  Reviewed labs, recommend limiting refined carbohydrates in the diet and increasing exercise will continue to monitor  Orders:  •  Tirzepatide-Weight Management (Zepbound) 2.5 mg/0.5 mL subcutaneous solution (vial); Inject 0.5 mL (2.5 mg total) under the skin once a week    Annual physical exam  Recommend Mediterranean diet, 2.5 hours of exercise weekly.  Have fasting lab work we will call with results        Need for hepatitis C screening test  Accepts screening  Orders:  •  Hepatitis C  Antibody; Future    Screening for HIV (human immunodeficiency virus)  Accepts screening  Orders:  •  HIV 1/2 AG/AB w Reflex [facility] for 2 yr old and above; Future    Encounter for immunization  Tdap and pneumonia today, shingles at pharmacy  Orders:  •  Pneumococcal Conjugate Vaccine 20-valent (Pcv20)  •  Zoster Vac Recomb Adjuvanted (Shingrix) 50 MCG/0.5ML SUSR; Inject 0.5 mL into a muscle once for 1 dose Repeat dose in 2 to 6 months  •  TDAP VACCINE GREATER THAN OR EQUAL TO 6YO IM    New onset headache  New onset headache blood pressure is within normal will do MRI, family history of glioblastoma.  Stay well-hydrated will continue to monitor.  ER for any neurologic changes   Orders:  •  MRI brain w wo contrast; Future    Depression, unspecified depression type  Recently lost sister, would like to hold off on medications at this time referral placed to talk therapy    Orders:  •  Ambulatory referral to Psych Services; Future    Renal mass         Paroxysmal atrial fibrillation (HCC)         Hypertensive heart disease with heart failure (HCC)  Wt Readings from Last 3 Encounters:   07/16/25 114 kg (250 lb 6.4 oz)   06/26/25 114 kg (251 lb)   06/25/25 114 kg (251 lb)                  Obesity, Class II, BMI 35-39.9  Prior Authorization Clinical Questions for Weight Management Pharmacotherapy    1. Does the patient have a contrainidcation to medication prescribed for weight management?: No  2. Does the patient have a diagnosis of obesity, confirmed by a BMI greater than or equal to 30 kg/m^2?: Yes  3. Does the patient have a BMI of greater than or equal to 27 kg/m^2 with at least one weight-related comorbidity/risk factor/complication (e.g. diabetes, dyslipidemia, coronary artery disease)?: Yes  4. Weight-related co-morbidities/risk factors: prediabetes, cardiovascular disease  5. WEGOVY CVA Indication: Does patient have established documented cardiovascular disease (history of a prior heart attack (myocardial  infarction), stroke, or symptomatic peripheral arterial disease (PAD)?: N/A  6. ZEPBOUND SCOTT Indication: Does patient have documented SCOTT diagnosed via sleep study (insurance will require copy of sleep study results for approval)?: N/A  7. Has the patient been on a weight loss regimen of low-calorie diet, increased physical activity, and lifestyle modifications for a minimum of 6 months?: Yes  8. Has the patient completed a comprehensive weight loss program (ie, Weight Watchers, Noom, Bariatrics, other naif on phone)? If so, what?: No  9. Does the patient have a history of type 2 diabetes?: No  10. Has the member tried and failed other weight loss medication within the past 12 months?: No  11. Will the member use requested medication in combination with another GLP agonist or weight loss drug?: No  12. Is the medication a controlled substance?: No  For renewals: Has the patient had a positive outcome with current weight management medication (i.e., change in body weight of at least 4-5% after 12-16 weeks on maximally tolerated dose)?: Yes     Baseline weight (in pounds): 259 lbs  Current weight (in pounds): 250.4 lbs  Weight loss percentage: -3.32%            Prediabetes         Annual physical exam               Preventive Screenings:  - Diabetes Screening: screening up-to-date and orders placed  - Cholesterol Screening: orders placed   - Hepatitis C screening: orders placed   - HIV screening: orders placed   - Cervical cancer screening: screening up-to-date   - Breast cancer screening: screening up-to-date   - Colon cancer screening: screening up-to-date   - Lung cancer screening: screening not indicated     Immunizations:  - Immunizations due: Zoster (Shingrix)    Counseling/Anticipatory Guidance:  - Alcohol: discussed moderation in alcohol intake and recommendations for healthy alcohol use.   - Drug use: discussed harms of illicit drug use and how it can negatively impact mental/physical health.   - Tobacco use:  discussed harms of tobacco use and management options for quitting.   - Dental health: discussed importance of regular tooth brushing, flossing, and dental visits.   - Sexual health: discussed sexually transmitted diseases, partner selection, use of condoms, avoidance of unintended pregnancy, and contraceptive alternatives.   - Diet: discussed recommendations for a healthy/well-balanced diet.   - Exercise: the importance of regular exercise/physical activity was discussed. Recommend exercise 3-5 times per week for at least 30 minutes.   - Injury prevention: discussed safety/seat belts, safety helmets, smoke detectors, carbon monoxide detectors, and smoking near bedding or upholstery.          History of Present Illness     Adult Annual Physical:  Patient presents for annual physical. Carolyne is here for annual .     Diet and Physical Activity:  - Diet/Nutrition: well balanced diet.  - Exercise: walking, 5-7 times a week on average, 30-60 minutes on average, strength training exercises and vigorous cardiovascular exercise.    General Health:  - Sleep: 4-6 hours of sleep on average, snores loudly and daytime hypersomnolence.  - Hearing: normal hearing left ear and normal hearing right ear.  - Vision: most recent eye exam < 1 year ago and wears glasses.  - Dental: regular dental visits, brushes teeth twice daily and floss regularly.    /GYN Health:  - Follows with GYN: yes.   - Menopause: postmenopausal.   - History of STDs: no    Advanced Care Planning:  - Has an advanced directive?: no    - Has a durable medical POA?: no      Review of Systems   Constitutional:  Negative for chills, diaphoresis and fever.   HENT:  Negative for congestion, ear pain, sinus pressure, sinus pain and sore throat.    Eyes:  Negative for visual disturbance.   Respiratory:  Negative for cough, chest tightness, shortness of breath and wheezing.    Cardiovascular:  Positive for palpitations. Negative for chest pain.   Gastrointestinal:   "Negative for abdominal pain, constipation, diarrhea, nausea and vomiting.   Genitourinary:  Negative for dysuria, frequency and hematuria.   Musculoskeletal:  Negative for arthralgias, back pain and myalgias.   Skin:  Negative for rash.   Neurological:  Positive for light-headedness. Negative for dizziness, seizures, syncope and headaches.   Psychiatric/Behavioral:  Positive for dysphoric mood. Negative for sleep disturbance. The patient is not nervous/anxious.    All other systems reviewed and are negative.    Pertinent Medical History           Medical History Reviewed by provider this encounter:  Tobacco  Allergies  Meds  Problems  Med Hx  Surg Hx  Fam Hx     .  Past Medical History   Past Medical History[1]  Past Surgical History[2]  Family History[3]   reports that she has never smoked. She has never used smokeless tobacco. She reports that she does not drink alcohol and does not use drugs.  Current Outpatient Medications   Medication Instructions   • ciprofloxacin (CILOXAN) 0.3 % ophthalmic solution 1 drop   • clindamycin (CLEOCIN T) 1 % lotion APPLY 1 APPLICATION TOPICALLY TO ACNE ON FACE TWICE DAILY   • diclofenac (VOLTAREN) 0.1 % ophthalmic solution    • diltiazem (CARDIZEM CD) 180 mg 24 hr capsule 1 capsule, Daily   • folic acid (FOLVITE) 1 mg, Daily   • nebivolol (BYSTOLIC) 5 mg, Daily   • prednisoLONE acetate (PRED FORTE) 1 % ophthalmic suspension    • tretinoin (RETIN-A) 0.025 % cream APPLY TOPICALLY TO ACNE ON FACE EVERY NIGHT AT BEDTIME   • Vitamin D, Ergocalciferol, 08778 units CAPS    • warfarin (COUMADIN) 5 mg, Oral, Daily (warfarin)   • Zepbound 2.5 mg, Subcutaneous, Weekly   • Zoster Vac Recomb Adjuvanted (Shingrix) 50 MCG/0.5ML SUSR 0.5 mL, Intramuscular, Once, Repeat dose in 2 to 6 months   Allergies[4]   Medications Ordered Prior to Encounter[5]   Social History[6]    Objective   /70 (BP Location: Right arm, Cuff Size: Large)   Pulse 76   Resp 12   Ht 5' 8\" (1.727 m)   Wt 114 " kg (250 lb 6.4 oz)   SpO2 99%   BMI 38.07 kg/m²     Physical Exam  Vitals and nursing note reviewed.   Constitutional:       General: She is not in acute distress.     Appearance: Normal appearance. She is well-developed. She is not ill-appearing.   HENT:      Head: Normocephalic and atraumatic.      Right Ear: Tympanic membrane, ear canal and external ear normal. There is no impacted cerumen.      Left Ear: Tympanic membrane, ear canal and external ear normal. There is no impacted cerumen.      Nose: Nose normal. No congestion or rhinorrhea.      Mouth/Throat:      Mouth: Mucous membranes are moist.      Pharynx: No posterior oropharyngeal erythema.     Eyes:      Extraocular Movements: Extraocular movements intact.      Conjunctiva/sclera: Conjunctivae normal.      Pupils: Pupils are equal, round, and reactive to light.       Cardiovascular:      Rate and Rhythm: Normal rate and regular rhythm.      Pulses: Normal pulses.      Heart sounds: Normal heart sounds. No murmur heard.  Pulmonary:      Effort: Pulmonary effort is normal. No respiratory distress.      Breath sounds: Normal breath sounds.   Abdominal:      General: Bowel sounds are normal. There is no distension.      Palpations: Abdomen is soft.      Tenderness: There is no abdominal tenderness.     Musculoskeletal:         General: No swelling or tenderness. Normal range of motion.      Cervical back: Normal range of motion and neck supple. No tenderness.      Right lower leg: No edema.      Left lower leg: No edema.   Lymphadenopathy:      Cervical: No cervical adenopathy.     Skin:     General: Skin is warm and dry.      Capillary Refill: Capillary refill takes less than 2 seconds.     Neurological:      General: No focal deficit present.      Mental Status: She is alert and oriented to person, place, and time.     Psychiatric:         Mood and Affect: Mood normal.         Behavior: Behavior normal.         Thought Content: Thought content normal.          Judgment: Judgment normal.              [1]  Past Medical History:  Diagnosis Date   • INGRID (acute kidney injury) ([facility]) 2023   • Hypertension    [2]  Past Surgical History:  Procedure Laterality Date   •  SECTION     [3]  No family history on file.[4]  No Known Allergies[5]  Current Outpatient Medications on File Prior to Visit   Medication Sig Dispense Refill   • diltiazem (CARDIZEM CD) 180 mg 24 hr capsule Take 1 capsule by mouth in the morning     • folic acid (FOLVITE) 1 mg tablet Take 1 mg by mouth in the morning.     • nebivolol (BYSTOLIC) 5 mg tablet Take 5 mg by mouth in the morning.     • prednisoLONE acetate (PRED FORTE) 1 % ophthalmic suspension      • tretinoin (RETIN-A) 0.025 % cream APPLY TOPICALLY TO ACNE ON FACE EVERY NIGHT AT BEDTIME     • Vitamin D, Ergocalciferol, 40962 units CAPS      • warfarin (COUMADIN) 5 mg tablet Take 1 tablet (5 mg total) by mouth daily 30 tablet 0   • ciprofloxacin (CILOXAN) 0.3 % ophthalmic solution Administer 1 drop to the right eye start after surgery     • clindamycin (CLEOCIN T) 1 % lotion APPLY 1 APPLICATION TOPICALLY TO ACNE ON FACE TWICE DAILY     • diclofenac (VOLTAREN) 0.1 % ophthalmic solution        No current facility-administered medications on file prior to visit.   [6]  Social History  Tobacco Use   • Smoking status: Never   • Smokeless tobacco: Never   Vaping Use   • Vaping status: Never Used   Substance and Sexual Activity   • Alcohol use: Never   • Drug use: Never

## 2025-07-16 NOTE — ASSESSMENT & PLAN NOTE
Wt Readings from Last 3 Encounters:   07/16/25 114 kg (250 lb 6.4 oz)   06/26/25 114 kg (251 lb)   06/25/25 114 kg (251 lb)     Great job with weight loss, appears euvolemic.  Will continue to monitor blood pressure continue to follow with cardiologist in [place]        Orders:  •  Tirzepatide-Weight Management (Zepbound) 2.5 mg/0.5 mL subcutaneous solution (vial); Inject 0.5 mL (2.5 mg total) under the skin once a week

## 2025-07-16 NOTE — ASSESSMENT & PLAN NOTE
Prior Authorization Clinical Questions for Weight Management Pharmacotherapy    1. Does the patient have a contrainidcation to medication prescribed for weight management?: No  2. Does the patient have a diagnosis of obesity, confirmed by a BMI greater than or equal to 30 kg/m^2?: Yes  3. Does the patient have a BMI of greater than or equal to 27 kg/m^2 with at least one weight-related comorbidity/risk factor/complication (e.g. diabetes, dyslipidemia, coronary artery disease)?: Yes  4. Weight-related co-morbidities/risk factors: prediabetes, cardiovascular disease  5. WEGOVY CVA Indication: Does patient have established documented cardiovascular disease (history of a prior heart attack (myocardial infarction), stroke, or symptomatic peripheral arterial disease (PAD)?: N/A  6. ZEPBOUND SCOTT Indication: Does patient have documented SCOTT diagnosed via sleep study (insurance will require copy of sleep study results for approval)?: N/A  7. Has the patient been on a weight loss regimen of low-calorie diet, increased physical activity, and lifestyle modifications for a minimum of 6 months?: Yes  8. Has the patient completed a comprehensive weight loss program (ie, Weight Watchers, Noom, Bariatrics, other naif on phone)? If so, what?: No  9. Does the patient have a history of type 2 diabetes?: No  10. Has the member tried and failed other weight loss medication within the past 12 months?: No  11. Will the member use requested medication in combination with another GLP agonist or weight loss drug?: No  12. Is the medication a controlled substance?: No  For renewals: Has the patient had a positive outcome with current weight management medication (i.e., change in body weight of at least 4-5% after 12-16 weeks on maximally tolerated dose)?: Yes     Baseline weight (in pounds): 259 lbs  Current weight (in pounds): 250.4 lbs  Weight loss percentage: -3.32%     Great job with weight loss, does have desire to transition to  Zepbound,Recommend Mediterranean diet, 2.5 hours of exercise weekly.  Have fasting lab work we will call with results    Orders:  •  Tirzepatide-Weight Management (Zepbound) 2.5 mg/0.5 mL subcutaneous solution (vial); Inject 0.5 mL (2.5 mg total) under the skin once a week

## 2025-07-17 LAB
HCV AB SER QL: NORMAL
HIV 1+2 AB+HIV1 P24 AG SERPL QL IA: NORMAL

## 2025-07-18 ENCOUNTER — TELEPHONE (OUTPATIENT)
Dept: FAMILY MEDICINE CLINIC | Facility: CLINIC | Age: 65
End: 2025-07-18

## 2025-07-18 DIAGNOSIS — I11.0 HYPERTENSIVE HEART DISEASE WITH HEART FAILURE (HCC): ICD-10-CM

## 2025-07-18 DIAGNOSIS — R73.03 PREDIABETES: ICD-10-CM

## 2025-07-18 DIAGNOSIS — E66.812 OBESITY, CLASS II, BMI 35-39.9: ICD-10-CM

## 2025-07-18 DIAGNOSIS — I48.0 PAROXYSMAL ATRIAL FIBRILLATION (HCC): ICD-10-CM

## 2025-07-18 RX ORDER — TIRZEPATIDE 2.5 MG/.5ML
2.5 INJECTION, SOLUTION SUBCUTANEOUS WEEKLY
Qty: 2 ML | Refills: 0 | Status: SHIPPED | OUTPATIENT
Start: 2025-07-18

## 2025-07-18 NOTE — TELEPHONE ENCOUNTER
Tammy sent a fax asking for you to send the script again and write to fill in vail in the sig please

## 2025-08-06 ENCOUNTER — HOSPITAL ENCOUNTER (OUTPATIENT)
Facility: CLINIC | Age: 65
Discharge: HOME | End: 2025-08-06
Payer: COMMERCIAL

## 2025-08-06 DIAGNOSIS — R06.83 SNORING: ICD-10-CM

## 2025-08-06 DIAGNOSIS — E66.812 OBESITY, CLASS II, BMI 35-39.9: ICD-10-CM

## 2025-08-06 PROCEDURE — G0399 HOME SLEEP TEST/TYPE 3 PORTA: HCPCS

## 2025-08-13 PROBLEM — G47.33 OSA (OBSTRUCTIVE SLEEP APNEA): Status: ACTIVE | Noted: 2025-08-13

## 2025-08-20 ENCOUNTER — OFFICE VISIT (OUTPATIENT)
Dept: FAMILY MEDICINE CLINIC | Facility: CLINIC | Age: 65
End: 2025-08-20
Payer: COMMERCIAL

## 2025-08-20 VITALS
BODY MASS INDEX: 38.34 KG/M2 | HEIGHT: 68 IN | SYSTOLIC BLOOD PRESSURE: 122 MMHG | DIASTOLIC BLOOD PRESSURE: 74 MMHG | OXYGEN SATURATION: 96 % | HEART RATE: 67 BPM | WEIGHT: 253 LBS

## 2025-08-20 DIAGNOSIS — R73.03 PREDIABETES: Primary | ICD-10-CM

## 2025-08-20 DIAGNOSIS — E66.812 OBESITY, CLASS II, BMI 35-39.9: ICD-10-CM

## 2025-08-20 PROBLEM — G47.34 SLEEP RELATED HYPOXIA: Status: ACTIVE | Noted: 2025-08-20

## 2025-08-20 PROCEDURE — 99214 OFFICE O/P EST MOD 30 MIN: CPT

## 2025-08-20 RX ORDER — TIRZEPATIDE 5 MG/.5ML
5 INJECTION, SOLUTION SUBCUTANEOUS WEEKLY
Qty: 2 ML | Refills: 0 | Status: SHIPPED | OUTPATIENT
Start: 2025-08-20 | End: 2025-08-20

## 2025-08-20 RX ORDER — TIRZEPATIDE 5 MG/.5ML
5 INJECTION, SOLUTION SUBCUTANEOUS WEEKLY
Qty: 2 ML | Refills: 0 | Status: SHIPPED | OUTPATIENT
Start: 2025-08-20 | End: 2025-08-22 | Stop reason: SDUPTHER

## 2025-08-22 ENCOUNTER — TELEPHONE (OUTPATIENT)
Age: 65
End: 2025-08-22